# Patient Record
Sex: FEMALE | Race: WHITE | NOT HISPANIC OR LATINO | Employment: FULL TIME | URBAN - METROPOLITAN AREA
[De-identification: names, ages, dates, MRNs, and addresses within clinical notes are randomized per-mention and may not be internally consistent; named-entity substitution may affect disease eponyms.]

---

## 2017-01-03 ENCOUNTER — PRE VISIT (OUTPATIENT)
Dept: MATERNAL FETAL MEDICINE | Facility: CLINIC | Age: 32
End: 2017-01-03

## 2017-01-06 ENCOUNTER — HOSPITAL ENCOUNTER (OUTPATIENT)
Dept: ULTRASOUND IMAGING | Facility: CLINIC | Age: 32
Discharge: HOME | End: 2017-01-06
Attending: ADVANCED PRACTICE MIDWIFE | Admitting: ADVANCED PRACTICE MIDWIFE
Payer: COMMERCIAL

## 2017-01-06 ENCOUNTER — OFFICE VISIT (OUTPATIENT)
Dept: MATERNAL FETAL MEDICINE | Facility: CLINIC | Age: 32
End: 2017-01-06
Attending: ADVANCED PRACTICE MIDWIFE
Payer: COMMERCIAL

## 2017-01-06 DIAGNOSIS — O30.032 MONOCHORIONIC DIAMNIOTIC TWIN GESTATION IN SECOND TRIMESTER: Primary | ICD-10-CM

## 2017-01-06 DIAGNOSIS — O26.90 PREGNANCY RELATED CONDITION, UNSPECIFIED TRIMESTER: ICD-10-CM

## 2017-01-06 PROCEDURE — 76812 OB US DETAILED ADDL FETUS: CPT

## 2017-01-06 PROCEDURE — 76820 UMBILICAL ARTERY ECHO: CPT | Performed by: ADVANCED PRACTICE MIDWIFE

## 2017-01-06 NOTE — PROGRESS NOTES
"Please see \"Imaging\" tab under \"Chart Review\" for details of today's US at the [facility].    Anthony Hull MD  Maternal-Fetal Medicine    "

## 2017-01-18 ENCOUNTER — HOSPITAL ENCOUNTER (OUTPATIENT)
Dept: CARDIOLOGY | Facility: CLINIC | Age: 32
End: 2017-01-18
Attending: OBSTETRICS & GYNECOLOGY
Payer: COMMERCIAL

## 2017-01-18 ENCOUNTER — OFFICE VISIT (OUTPATIENT)
Dept: MATERNAL FETAL MEDICINE | Facility: CLINIC | Age: 32
End: 2017-01-18
Attending: OBSTETRICS & GYNECOLOGY
Payer: COMMERCIAL

## 2017-01-18 ENCOUNTER — HOSPITAL ENCOUNTER (OUTPATIENT)
Dept: ULTRASOUND IMAGING | Facility: CLINIC | Age: 32
Discharge: HOME | End: 2017-01-18
Attending: OBSTETRICS & GYNECOLOGY | Admitting: OBSTETRICS & GYNECOLOGY
Payer: COMMERCIAL

## 2017-01-18 DIAGNOSIS — O30.032 MONOCHORIONIC DIAMNIOTIC TWIN GESTATION IN SECOND TRIMESTER: ICD-10-CM

## 2017-01-18 DIAGNOSIS — O30.032 MONOCHORIONIC DIAMNIOTIC TWIN GESTATION IN SECOND TRIMESTER: Primary | ICD-10-CM

## 2017-01-18 PROCEDURE — 76825 ECHO EXAM OF FETAL HEART: CPT

## 2017-01-18 PROCEDURE — 76820 UMBILICAL ARTERY ECHO: CPT | Performed by: OBSTETRICS & GYNECOLOGY

## 2017-01-18 PROCEDURE — 76816 OB US FOLLOW-UP PER FETUS: CPT | Mod: 59

## 2017-01-18 PROCEDURE — 76825 ECHO EXAM OF FETAL HEART: CPT | Mod: 59

## 2017-02-03 ENCOUNTER — OFFICE VISIT (OUTPATIENT)
Dept: MATERNAL FETAL MEDICINE | Facility: CLINIC | Age: 32
End: 2017-02-03
Attending: OBSTETRICS & GYNECOLOGY
Payer: COMMERCIAL

## 2017-02-03 ENCOUNTER — HOSPITAL ENCOUNTER (OUTPATIENT)
Dept: ULTRASOUND IMAGING | Facility: CLINIC | Age: 32
Discharge: HOME | End: 2017-02-03
Attending: OBSTETRICS & GYNECOLOGY | Admitting: OBSTETRICS & GYNECOLOGY
Payer: COMMERCIAL

## 2017-02-03 DIAGNOSIS — O30.032 MONOCHORIONIC DIAMNIOTIC TWIN GESTATION IN SECOND TRIMESTER: ICD-10-CM

## 2017-02-03 DIAGNOSIS — O40.1XX2: ICD-10-CM

## 2017-02-03 DIAGNOSIS — O30.032 MONOCHORIONIC DIAMNIOTIC TWIN GESTATION IN SECOND TRIMESTER: Primary | ICD-10-CM

## 2017-02-03 PROCEDURE — 76816 OB US FOLLOW-UP PER FETUS: CPT | Mod: 59

## 2017-02-03 PROCEDURE — 76820 UMBILICAL ARTERY ECHO: CPT | Performed by: OBSTETRICS & GYNECOLOGY

## 2017-02-03 NOTE — PROGRESS NOTES
"Please see \"Imaging\" tab under \"Chart Review\" for details of today's US.      Karin Castillo, DO  Maternal-Fetal Medicine  February 3, 2017      "

## 2017-02-17 ENCOUNTER — OFFICE VISIT (OUTPATIENT)
Dept: MATERNAL FETAL MEDICINE | Facility: CLINIC | Age: 32
End: 2017-02-17
Attending: OBSTETRICS & GYNECOLOGY
Payer: COMMERCIAL

## 2017-02-17 ENCOUNTER — HOSPITAL ENCOUNTER (OUTPATIENT)
Dept: ULTRASOUND IMAGING | Facility: CLINIC | Age: 32
Discharge: HOME | End: 2017-02-17
Attending: OBSTETRICS & GYNECOLOGY | Admitting: OBSTETRICS & GYNECOLOGY
Payer: COMMERCIAL

## 2017-02-17 DIAGNOSIS — O30.033 MONOCHORIONIC DIAMNIOTIC TWIN GESTATION IN THIRD TRIMESTER: Primary | ICD-10-CM

## 2017-02-17 DIAGNOSIS — O30.032 MONOCHORIONIC DIAMNIOTIC TWIN GESTATION IN SECOND TRIMESTER: ICD-10-CM

## 2017-02-17 DIAGNOSIS — O40.3XX1 POLYHYDRAMNIOS IN THIRD TRIMESTER, FETUS 1: ICD-10-CM

## 2017-02-17 DIAGNOSIS — O40.3XX2 POLYHYDRAMNIOS, THIRD TRIMESTER, FETUS 2: ICD-10-CM

## 2017-02-17 PROCEDURE — 76816 OB US FOLLOW-UP PER FETUS: CPT | Mod: 59

## 2017-02-17 PROCEDURE — 76820 UMBILICAL ARTERY ECHO: CPT | Performed by: OBSTETRICS & GYNECOLOGY

## 2017-02-17 NOTE — MR AVS SNAPSHOT
After Visit Summary   2/17/2017    Julius Wilson    MRN: [id number]           Patient Information     Date Of Birth          1985        Visit Information        Provider Department      2/17/2017 2:45 PM Anthony Hull MD [facility] Maternal Fetal Medicine [facility]        Today's Diagnoses     Monochorionic diamniotic twin gestation in third trimester    -  1    Polyhydramnios in third trimester, fetus 1        Polyhydramnios, third trimester, fetus 2           Follow-ups after your visit        Your next 10 appointments already scheduled     Feb 24, 2017  3:30 PM CST   MFM BPP TWINS with [email or web address]   [facility] Maternal Fetal Medicine Ultrasound - [facility])    [address]  [facility] [address] [phone]            Feb 24, 2017  4:00 PM CST   Radiology MD with RH MFAARON MAHONEY   [facility] Maternal Fetal Medicine [facility] ([facility])    [address]  [facility] [address] [phone]           Please arrive at the time given for your first appointment.  This visit is used internally to schedule the physician's time during your ultrasound.            Mar 03, 2017  3:00 PM CST   MFM US COMPRE TWINS F/U with [email or web address]   [facility] Maternal Fetal Medicine Ultrasound - [facility] ([facility])    [address]  [facility] [address] [phone]           Wear comfortable clothes and leave your valuables at home.            Mar 03, 2017  3:30 PM CST   Radiology MD with  MFAARON MAHONEY   [facility] Maternal Fetal Medicine [facility] ([facility])    [address]  [facility] [address] [phone]           Please arrive at the time given for your first appointment.  This visit is used internally to schedule the physician's time during your ultrasound.            Mar 10, 2017  3:00 PM CST   MFM BPP TWINS with [email or web address]   [facility] Maternal Fetal Medicine Ultrasound -  [facility] ([facility])    [address]  [facility] [address]   [phone]            Mar 10, 2017  3:30 PM CST   Radiology MD with RH MFAARON MAHONEY   [facility] Maternal Fetal [facility] ([facility])    [address]  [facility] [address]   [phone]           Please arrive at the time given for your first appointment.  This visit is used internally to schedule the physician's time during your ultrasound.            Mar 17, 2017  3:00 PM CDT   MFM BPP TWINS with [email or web address]   [facility] Fetal Medicine Ultrasound - [facility])    [address]  [facility] [address]   [phone]            Mar 17, 2017  3:30 PM CDT   Radiology MD with RH DINORA MAHONEY   [facility] ([facility])    [address]  [facility] [address] [phone]           Please arrive at the time given for your first appointment.  This visit is used internally to schedule the physician's time during your ultrasound.              Future tests that were ordered for you today     Open Future Orders        Priority Expected Expires Ordered    MFM BPP Twins Routine 2/24/2017 12/17/2017 2/17/2017    MFM BPP Twins Routine 3/10/2017 12/17/2017 2/17/2017    MFM BPP Twins Routine 3/17/2017 12/17/2017 2/17/2017            Who to contact     If you have questions or need follow up information about today's clinic visit or your schedule please contact [facility]ERNAL FETAL [facility] directly at [phone].  Normal or non-critical lab and imaging results will be communicated to you by MyChart, letter or phone within 4 business days after the clinic has received the results. If you do not hear from us within 7 days, please contact the clinic through MyChart or phone. If you have a critical or abnormal lab result, we will notify you by phone as soon as possible.  Submit refill  "requests through Status Overload or call your pharmacy and they will forward the refill request to us. Please allow 3 business days for your refill to be completed.          Additional Information About Your Visit        Kyphart Information     Status Overload lets you send messages to your doctor, view your test results, renew your prescriptions, schedule appointments and more. To sign up, go to [email or web address] . Click on \"Log in\" on the left side of the screen, which will take you to the Welcome page. Then click on \"Sign up Now\" on the right side of the page.     You will be asked to enter the access code listed below, as well as some personal information. Please follow the directions to create your username and password.     Your access code is: [email or web address]  Expires: 2017  3:15 PM     Your access code will  in 90 days. If you need help or a new code, please call your [place] clinic or [phone].        Care EveryWhere ID     This is your Care EveryWhere ID. This could be used by other organizations to access your [place] medical records  [id number]        Your Vitals Were     Last Period                   2016            Blood Pressure from Last 3 Encounters:   No data found for BP    Weight from Last 3 Encounters:   No data found for Wt               Primary Care Provider    No Pcp Confirmed       No address on file        Thank you!     Thank you for choosing [facility]  for your care. Our goal is always to provide you with excellent care. Hearing back from our patients is one way we can continue to improve our services. Please take a few minutes to complete the written survey that you may receive in the mail after your visit with us. Thank you!             Your Updated Medication List - Protect others around you: Learn how to safely use, store and throw away your medicines at www.disposemymeds.org.      Notice  As of 2017  3:15 PM    You have not been " prescribed any medications.

## 2017-02-17 NOTE — PROGRESS NOTES
"Please see \"Imaging\" tab under \"Chart Review\" for details of today's US at the [facility].    Anthony Hull MD  Maternal-Fetal Medicine      "

## 2017-02-24 ENCOUNTER — OFFICE VISIT (OUTPATIENT)
Dept: MATERNAL FETAL MEDICINE | Facility: CLINIC | Age: 32
End: 2017-02-24
Attending: OBSTETRICS & GYNECOLOGY
Payer: COMMERCIAL

## 2017-02-24 ENCOUNTER — HOSPITAL ENCOUNTER (OUTPATIENT)
Dept: ULTRASOUND IMAGING | Facility: CLINIC | Age: 32
Discharge: HOME | End: 2017-02-24
Attending: OBSTETRICS & GYNECOLOGY | Admitting: OBSTETRICS & GYNECOLOGY
Payer: COMMERCIAL

## 2017-02-24 DIAGNOSIS — O30.033 MONOCHORIONIC DIAMNIOTIC TWIN GESTATION IN THIRD TRIMESTER: Primary | ICD-10-CM

## 2017-02-24 DIAGNOSIS — O30.033 MONOCHORIONIC DIAMNIOTIC TWIN GESTATION IN THIRD TRIMESTER: ICD-10-CM

## 2017-02-24 DIAGNOSIS — O40.3XX1 POLYHYDRAMNIOS IN THIRD TRIMESTER, FETUS 1: ICD-10-CM

## 2017-02-24 PROCEDURE — 76819 FETAL BIOPHYS PROFIL W/O NST: CPT | Performed by: OBSTETRICS & GYNECOLOGY

## 2017-02-24 PROCEDURE — 76819 FETAL BIOPHYS PROFIL W/O NST: CPT

## 2017-02-24 NOTE — PROGRESS NOTES
"Please see \"Imaging\" tab under \"Chart Review\" for details of today's US at the [facility].    Anthony Hull MD  Maternal-Fetal Medicine    "

## 2017-02-24 NOTE — MR AVS SNAPSHOT
After Visit Summary   2/24/2017    Julius Wilson    MRN: [id number]           Patient Information     Date Of Birth          1985        Visit Information        Provider Department      2/24/2017 4:00 PM Anthony Hull MD [facility] Maternal Fetal Medicine [facility]        Today's Diagnoses     Monochorionic diamniotic twin gestation in third trimester    -  1       Follow-ups after your visit        Your next 10 appointments already scheduled     Mar 03, 2017  3:00 PM CST   MFM US COMPRE TWINS F/U with [email or web address]   [facility] Maternal Fetal Medicine Ultrasound - [facility] ([facility])    [address]  [facility] [address] [phone]           Wear comfortable clothes and leave your valuables at home.            Mar 03, 2017  3:30 PM CST   Radiology MD with  MFAARON MAHONEY   [facility] Maternal Fetal Medicine [facility] ([facility])    [address]  [facility] [address] [phone]           Please arrive at the time given for your first appointment.  This visit is used internally to schedule the physician's time during your ultrasound.            Mar 10, 2017  3:00 PM CST   MFM BPP TWINS with [email or web address]   [facility] Maternal Fetal Medicine Ultrasound - [facility] ([facility])    [address]  [facility] [address] [phone]            Mar 10, 2017  3:30 PM CST   Radiology MD with  MFAARON MAHONEY   [facility] Fetal Medicine [facility] ([facility])    [address]  [facility] [address] [phone]           Please arrive at the time given for your first appointment.  This visit is used internally to schedule the physician's time during your ultrasound.            Mar 17, 2017  3:00 PM CDT   MFM BPP TWINS with [email or web address]   [facility] Maternal Fetal Medicine Ultrasound - [facility] ([facility])    [address]  [facility] [address]  "  [phone]            Mar 17, 2017  3:30 PM CDT   Radiology MD with  DINORA MAHONEY   [facility] Maternal Fetal Medicine [facility] ([facility])    [address] [facility] [address]  [facility] [address] [phone]           Please arrive at the time given for your first appointment.  This visit is used internally to schedule the physician's time during your ultrasound.              Who to contact     If you have questions or need follow up information about today's clinic visit or your schedule please contact [facility] MATERNAL FETAL MEDICINE [facility] directly at [phone].  Normal or non-critical lab and imaging results will be communicated to you by PlayOn! Sportshart, letter or phone within 4 business days after the clinic has received the results. If you do not hear from us within 7 days, please contact the clinic through PlayOn! Sportshart or phone. If you have a critical or abnormal lab result, we will notify you by phone as soon as possible.  Submit refill requests through Teramind or call your pharmacy and they will forward the refill request to us. Please allow 3 business days for your refill to be completed.          Additional Information About Your Visit        PlayOn! [employer]sharHealth Plotter Information     Teramind lets you send messages to your doctor, view your test results, renew your prescriptions, schedule appointments and more. To sign up, go to [email or web address] . Click on \"Log in\" on the left side of the screen, which will take you to the Welcome page. Then click on \"Sign up Now\" on the right side of the page.     You will be asked to enter the access code listed below, as well as some personal information. Please follow the directions to create your username and password.     Your access code is: [email or web address]  Expires: 2017  3:15 PM     Your access code will  in 90 days. If you need help or a new code, please call your [place] clinic or [phone].        Care EveryWhere ID     This is your Care EveryWhere " ID. This could be used by other organizations to access your [place] medical records  [id number]        Your Vitals Were     Last Period                   08/12/2016            Blood Pressure from Last 3 Encounters:   No data found for BP    Weight from Last 3 Encounters:   No data found for Wt              Today, you had the following     No orders found for display       Primary Care Provider    No Pcp Confirmed       No address on file        Thank you!     Thank you for choosing [facility]  for your care. Our goal is always to provide you with excellent care. Hearing back from our patients is one way we can continue to improve our services. Please take a few minutes to complete the written survey that you may receive in the mail after your visit with us. Thank you!             Your Updated Medication List - Protect others around you: Learn how to safely use, store and throw away your medicines at www.disposemymeds.org.      Notice  As of 2/24/2017  4:33 PM    You have not been prescribed any medications.

## 2017-03-03 ENCOUNTER — HOSPITAL ENCOUNTER (OUTPATIENT)
Dept: ULTRASOUND IMAGING | Facility: CLINIC | Age: 32
Discharge: HOME | End: 2017-03-03
Attending: OBSTETRICS & GYNECOLOGY | Admitting: OBSTETRICS & GYNECOLOGY
Payer: COMMERCIAL

## 2017-03-03 ENCOUNTER — HOSPITAL ENCOUNTER (OUTPATIENT)
Facility: CLINIC | Age: 32
Discharge: HOME | End: 2017-03-05
Attending: OBSTETRICS & GYNECOLOGY | Admitting: OBSTETRICS & GYNECOLOGY
Payer: COMMERCIAL

## 2017-03-03 ENCOUNTER — OFFICE VISIT (OUTPATIENT)
Dept: MATERNAL FETAL MEDICINE | Facility: CLINIC | Age: 32
End: 2017-03-03
Attending: OBSTETRICS & GYNECOLOGY
Payer: COMMERCIAL

## 2017-03-03 ENCOUNTER — HOSPITAL ENCOUNTER (OUTPATIENT)
Dept: ULTRASOUND IMAGING | Facility: CLINIC | Age: 32
End: 2017-03-03
Attending: OBSTETRICS & GYNECOLOGY
Payer: COMMERCIAL

## 2017-03-03 DIAGNOSIS — O43.023 TWIN TO TWIN TRANSFUSION, THIRD TRIMESTER: ICD-10-CM

## 2017-03-03 DIAGNOSIS — O30.039 MONOCHORIONIC DIAMNIOTIC TWIN PREGNANCY: Primary | ICD-10-CM

## 2017-03-03 DIAGNOSIS — O30.032 MONOCHORIONIC DIAMNIOTIC TWIN GESTATION IN SECOND TRIMESTER: ICD-10-CM

## 2017-03-03 DIAGNOSIS — O30.033 MONOCHORIONIC DIAMNIOTIC TWIN GESTATION IN THIRD TRIMESTER: Primary | ICD-10-CM

## 2017-03-03 DIAGNOSIS — O41.00X2: ICD-10-CM

## 2017-03-03 DIAGNOSIS — O40.1XX1: ICD-10-CM

## 2017-03-03 LAB
A1 MICROGLOB PLACENTAL VAG QL: NEGATIVE
ABO + RH BLD: NORMAL
ABO + RH BLD: NORMAL
BLD GP AB SCN SERPL QL: NORMAL
BLOOD BANK CMNT PATIENT-IMP: NORMAL
ERYTHROCYTE [DISTWIDTH] IN BLOOD BY AUTOMATED COUNT: 12.7 % (ref 10–15)
HCT VFR BLD AUTO: 31.4 % (ref 35–47)
HGB BLD-MCNC: 10.5 G/DL (ref 11.7–15.7)
MCH RBC QN AUTO: 30.2 PG (ref 26.5–33)
MCHC RBC AUTO-ENTMCNC: 33.4 G/DL (ref 31.5–36.5)
MCV RBC AUTO: 90 FL (ref 78–100)
PLATELET # BLD AUTO: 242 10E9/L (ref 150–450)
RBC # BLD AUTO: 3.48 10E12/L (ref 3.8–5.2)
SPECIMEN EXP DATE BLD: NORMAL
WBC # BLD AUTO: 12.9 10E9/L (ref 4–11)

## 2017-03-03 PROCEDURE — 76815 OB US LIMITED FETUS(S): CPT

## 2017-03-03 PROCEDURE — 76816 OB US FOLLOW-UP PER FETUS: CPT | Mod: 59

## 2017-03-03 PROCEDURE — 25000125 ZZHC RX 250: Performed by: OBSTETRICS & GYNECOLOGY

## 2017-03-03 PROCEDURE — 84112 EVAL AMNIOTIC FLUID PROTEIN: CPT | Performed by: OBSTETRICS & GYNECOLOGY

## 2017-03-03 PROCEDURE — 85027 COMPLETE CBC AUTOMATED: CPT | Performed by: OBSTETRICS & GYNECOLOGY

## 2017-03-03 PROCEDURE — 86900 BLOOD TYPING SEROLOGIC ABO: CPT | Performed by: OBSTETRICS & GYNECOLOGY

## 2017-03-03 PROCEDURE — 96372 THER/PROPH/DIAG INJ SC/IM: CPT | Mod: 59

## 2017-03-03 PROCEDURE — 25000125 ZZHC RX 250

## 2017-03-03 PROCEDURE — 36415 COLL VENOUS BLD VENIPUNCTURE: CPT | Performed by: OBSTETRICS & GYNECOLOGY

## 2017-03-03 PROCEDURE — 86901 BLOOD TYPING SEROLOGIC RH(D): CPT | Performed by: OBSTETRICS & GYNECOLOGY

## 2017-03-03 PROCEDURE — 86850 RBC ANTIBODY SCREEN: CPT | Performed by: OBSTETRICS & GYNECOLOGY

## 2017-03-03 PROCEDURE — 59001 AMNIOCENTESIS THERAPEUTIC: CPT | Performed by: OBSTETRICS & GYNECOLOGY

## 2017-03-03 PROCEDURE — 76819 FETAL BIOPHYS PROFIL W/O NST: CPT | Performed by: OBSTETRICS & GYNECOLOGY

## 2017-03-03 RX ORDER — CALCIUM CARBONATE 500(1250)
500 TABLET ORAL 2 TIMES DAILY
COMMUNITY
End: 2021-11-29

## 2017-03-03 RX ORDER — FOLIC ACID 1 MG/1
TABLET ORAL
COMMUNITY
Start: 2017-01-09 | End: 2021-11-29

## 2017-03-03 RX ORDER — SODIUM CHLORIDE, SODIUM LACTATE, POTASSIUM CHLORIDE, CALCIUM CHLORIDE 600; 310; 30; 20 MG/100ML; MG/100ML; MG/100ML; MG/100ML
INJECTION, SOLUTION INTRAVENOUS CONTINUOUS
Status: DISCONTINUED | OUTPATIENT
Start: 2017-03-03 | End: 2017-03-03

## 2017-03-03 RX ORDER — ONDANSETRON 2 MG/ML
4 INJECTION INTRAMUSCULAR; INTRAVENOUS EVERY 6 HOURS PRN
Status: DISCONTINUED | OUTPATIENT
Start: 2017-03-03 | End: 2017-03-05 | Stop reason: HOSPADM

## 2017-03-03 RX ORDER — PRENATAL VIT/IRON FUM/FOLIC AC 27MG-0.8MG
1 TABLET ORAL DAILY
COMMUNITY
End: 2021-11-29

## 2017-03-03 RX ORDER — BETAMETHASONE SODIUM PHOSPHATE AND BETAMETHASONE ACETATE 3; 3 MG/ML; MG/ML
12 INJECTION, SUSPENSION INTRA-ARTICULAR; INTRALESIONAL; INTRAMUSCULAR; SOFT TISSUE ONCE
Status: COMPLETED | OUTPATIENT
Start: 2017-03-03 | End: 2017-03-03

## 2017-03-03 RX ORDER — ACETAMINOPHEN 325 MG/1
650 TABLET ORAL EVERY 6 HOURS PRN
Status: DISCONTINUED | OUTPATIENT
Start: 2017-03-03 | End: 2017-03-05 | Stop reason: HOSPADM

## 2017-03-03 RX ADMIN — BETAMETHASONE SODIUM PHOSPHATE AND BETAMETHASONE ACETATE 12 MG: 3; 3 INJECTION, SUSPENSION INTRA-ARTICULAR; INTRALESIONAL; INTRAMUSCULAR at 20:02

## 2017-03-03 NOTE — IP AVS SNAPSHOT
MRN:[id number]                      After Visit Summary   3/3/2017    Julius Wilson    MRN: [id number]           Thank you!     Thank you for choosing [place] for your care. Our goal is always to provide you with excellent care. Hearing back from our patients is one way we can continue to improve our services. Please take a few minutes to complete the written survey that you may receive in the mail after you visit with us. Thank you!        Patient Information     Date Of Birth          1985        About your hospital stay     You were admitted on:  March 3, 2017 You last received care in the:  [facility]    You were discharged on:  March 5, 2017       Who to Call     For medical emergencies, please call 911.  For non-urgent questions about your medical care, please call your primary care provider or clinic, None          Attending Provider     Provider Specialty    Anthony Hull MD OB/Gyn       Primary Care Provider    No Pcp Confirmed       No address on file        Your next 10 appointments already scheduled     Mar 10, 2017  3:00 PM CST   MFM BPP TWINS with [email or web address]   [facility]etal [facility] Ultrasound - [facility])    [address]  [facility] [address]   [phone]            Mar 10, 2017  3:30 PM CST   Radiology MD with  DINORA MAHONEY   [facility] Maternal Fetal Medicine - [facility])    [address]  [facility] [address]   [phone]           Please arrive at the time given for your first appointment.  This visit is used internally to schedule the physician's time during your ultrasound.            Mar 17, 2017  3:00 PM CDT   MFM BPP TWINS with [email or web address]   [facility] Maternal Fetal Medicine Ultrasound - [place] ([facility])    [address]  [facility] [address] [phone]            Mar 17, 2017  3:30 PM CDT   Radiology MD with UR  DINORA MAHONEY   [facility] Maternal Fetal Medicine - [place] ([facility])    [address]  [facility] [address]   [phone]           Please arrive at the time given for your first appointment.  This visit is used internally to schedule the physician's time during your ultrasound.            Mar 24, 2017  2:15 PM CDT   DINORA BPP TWINS with [email or web address]   [facility] Maternal Fetal Medicine Ultrasound - [place] ([facility])    [address]  [facility] [address]   [phone]            Mar 24, 2017  2:45 PM CDT   Radiology MD with UR DINORA MAHONEY   [facility] Maternal Fetal Medicine - [place] ([facility])    [address]  [facility] [address]   [phone]           Please arrive at the time given for your first appointment.  This visit is used internally to schedule the physician's time during your ultrasound.            Mar 31, 2017  1:30 PM CDT   DINORA US COMPRE TWINS F/U with [email or web address]   [facility] Maternal Fetal Medicine Ultrasound - [place] ([facility])    [address]  [facility] [address] [phone]           Wear comfortable clothes and leave your valuables at home.            Mar 31, 2017  2:00 PM CDT   Radiology MD with UR DINORA MAHONEY   [facility] Maternal Fetal Medicine - [place] ([facility])    [address]  [facility] [address]   [phone]           Please arrive at the time given for your first appointment.  This visit is used internally to schedule the physician's time during your ultrasound.              Further instructions from your care team       Discharge Instruction for Undelivered Patients      You were seen for: Fetal Assessment  We Consulted: Dr. Hull  You had (Test or Medicine):monitoring, ultrasound     Diet:   Drink 8 to 12 glasses of liquids (milk, juice, water) every day.  You may  "eat meals and snacks.     Activity:  Call your doctor or nurse midwife if your baby is moving less than usual.     Call your provider if you notice:  Swelling in your face or increased swelling in your hands or legs.  Headaches that are not relieved by Tylenol (acetaminophen).  Changes in your vision (blurring: seeing spots or stars.)  Nausea (sick to your stomach) and vomiting (throwing up).   Weight gain of 5 pounds or more per week.  Heartburn that doesn't go away.  Signs of bladder infection: pain when you urinate (use the toilet), need to go more often and more urgently.  The bag of ribera (rupture of membranes) breaks, or you notice leaking in your underwear.  Bright red blood in your underwear.  Abdominal (lower belly) or stomach pain.  For first baby: Contractions (tightening) less than 5 minutes apart for one hour or more.  Second (plus) baby: Contractions (tightening) less than 10 minutes apart and getting stronger.  *If less than 34 weeks: Contractions (tightenings) more than 6 times in one hour.  Increase or change in vaginal discharge (note the color and amount)      Follow-up:  3/6/2017 in the [facility] 4th floor of the Professional building.        Pending Results     No orders found from 3/1/2017 to 3/4/2017.            Admission Information     Date & Time Provider Department Dept. Phone    3/3/2017 Anthony Hull MD [facility] [phone]      Your Vitals Were     Blood Pressure Pulse Temperature Respirations Last Period       102/66 98 98  F (36.7  C) (Oral) 20 08/12/2016       Add2paper Information     Add2paper lets you send messages to your doctor, view your test results, renew your prescriptions, schedule appointments and more. To sign up, go to [email or web address] . Click on \"Log in\" on the left side of the screen, which will take you to the Welcome page. Then click on \"Sign up Now\" on the right side of the page.     You will be asked to enter the access code listed below, as " well as some personal information. Please follow the directions to create your username and password.     Your access code is: [email or web address]  Expires: 2017  3:15 PM     Your access code will  in 90 days. If you need help or a new code, please call your [place] clinic or [phone].        Care EveryWhere ID     This is your Care EveryWhere ID. This could be used by other organizations to access your [place] medical records  [id number]           Review of your medicines      CONTINUE these medicines which have NOT CHANGED        Dose / Directions    calcium carbonate 500 MG tablet   Commonly known as:  OS-TIARRA 500 mg [place]. Ca        Dose:  500 mg   Take 500 mg by mouth 2 times daily   Refills:  0       folic acid 1 MG tablet   Commonly known as:  FOLVITE        Refills:  0       IRON SUPPLEMENT PO   Indication:  Anemia From Inadequate Iron in the Body        Dose:  325 mg   Take 325 mg by mouth 2 times daily (with meals)   Refills:  0       prenatal multivitamin  plus iron 27-0.8 MG Tabs per tablet        Dose:  1 tablet   Take 1 tablet by mouth daily   Refills:  0       VITAMIN D (CHOLECALCIFEROL) PO        Dose:  2000 Units   Take 2,000 Units by mouth daily   Refills:  0                Protect others around you: Learn how to safely use, store and throw away your medicines at www.disposemymeds.org.             Medication List: This is a list of all your medications and when to take them. Check marks below indicate your daily home schedule. Keep this list as a reference.      Medications           Morning Afternoon Evening Bedtime As Needed    calcium carbonate 500 MG tablet   Commonly known as:  OS-TIARRA 500 mg [place]. Ca   Take 500 mg by mouth 2 times daily                                folic acid 1 MG tablet   Commonly known as:  FOLVITE                                IRON SUPPLEMENT PO   Take 325 mg by mouth 2 times daily (with meals)                                prenatal multivitamin  plus iron  27-0.8 MG Tabs per tablet   Take 1 tablet by mouth daily                                VITAMIN D (CHOLECALCIFEROL) PO   Take 2,000 Units by mouth daily

## 2017-03-03 NOTE — IP AVS SNAPSHOT
[facility]    [address] [place] [address]    Phone:  [phone]                                       After Visit Summary   3/3/2017    Julius Wilson    MRN: [id number]           After Visit Summary Signature Page     I have received my discharge instructions, and my questions have been answered. I have discussed any challenges I see with this plan with the nurse or doctor.    ..........................................................................................................................................  Patient/Patient Representative Signature      ..........................................................................................................................................  Patient Representative Print Name and Relationship to Patient    ..................................................               ................................................  Date                                            Time    ..........................................................................................................................................  Reviewed by Signature/Title    ...................................................              ..............................................  Date                                                            Time

## 2017-03-03 NOTE — PROGRESS NOTES
"Please see \"Imaging\" tab under \"Chart Review\" for details of today's US.      Karin Castillo, DO  Maternal-Fetal Medicine  March 3, 2017      "

## 2017-03-03 NOTE — MR AVS SNAPSHOT
After Visit Summary   3/3/2017    Julius Wilson    MRN: [id number]           Patient Information     Date Of Birth          1985        Visit Information        Provider Department      3/3/2017 3:30 PM Karin Castillo,  [facility]        Today's Diagnoses     Monochorionic diamniotic twin gestation in third trimester    -  1    Anhydramnios, unspecified trimester, fetus 2        Polyhydramnios, first trimester, fetus 1           Follow-ups after your visit        Your next 10 appointments already scheduled     Mar 10, 2017  3:00 PM CST   MFM BPP TWINS with [id number]   [facility] ([facility])    [address]  [facility] [address]   [phone]            Mar 10, 2017  3:30 PM CST   Radiology MD with RH MFM MD   [facility] ([facility])    [address]  [facility] [address]   [phone]           Please arrive at the time given for your first appointment.  This visit is used internally to schedule the physician's time during your ultrasound.            Mar 17, 2017  3:00 PM CDT   MFM BPP TWINS with [id number]   [facility])    [address]  [facility] [address] [phone]            Mar 17, 2017  3:30 PM CDT   Radiology MD with UR MFM MD   [facility])    [address]  [facility] [address]   [phone]           Please arrive at the time given for your first appointment.  This visit is used internally to schedule the physician's time during your ultrasound.            Mar 24, 2017  2:15 PM CDT   MFM BPP TWINS with [email or web address]   [facility] [place] ([facility]  [facility])    [address]  [facility] [address]   [phone]            Mar 24, 2017  2:45 PM CDT   Radiology MD with UR DINORA MAHONEY   [facility]etal Medicine - [place] ([facility])    [address]  [facility] [address]   [phone]           Please arrive at the time given for your first appointment.  This visit is used internally to schedule the physician's time during your ultrasound.            Mar 31, 2017  1:30 PM CDT   [facility] US COMPRE TWINS F/U with [id number]   [facility] Ultrasound - [facility])    [address]  [facility] [address]   [phone]           Wear comfortable clothes and leave your valuables at home.            Mar 31, 2017  2:00 PM CDT   Radiology MD with UR DINORA MAHONEY   [facility] - [facility])    [address]  [facility] [address]   [phone]           Please arrive at the time given for your first appointment.  This visit is used internally to schedule the physician's time during your ultrasound.              Future tests that were ordered for you today     Open Future Orders        Priority Expected Expires Ordered    [facility] BPP Twins Routine 3/24/2017 1/3/2018 3/3/2017    [facility] Comprehensive Twins F/U Routine 3/31/2017 1/3/2018 3/3/2017            Who to contact     If you have questions or need follow up information about today's clinic visit or your schedule please contact [facility] directly at [phone].  Normal or non-critical lab and imaging results will be communicated to you by MyChart, letter or phone within 4 business days after the clinic has received the results. If you do not hear from us within 7 days, please contact the clinic through MyChart or phone. If you have a critical or abnormal lab result, we will notify you by phone  "as soon as possible.  Submit refill requests through [employer] or call your pharmacy and they will forward the refill request to us. Please allow 3 business days for your refill to be completed.          Additional Information About Your Visit        Pum[employer]ing[employer] Information     [employer] lets you send messages to your doctor, view your test results, renew your prescriptions, schedule appointments and more. To sign up, go to [email or web address] . Click on \"Log in\" on the left side of the screen, which will take you to the Welcome page. Then click on \"Sign up Now\" on the right side of the page.     You will be asked to enter the access code listed below, as well as some personal information. Please follow the directions to create your username and password.     Your access code is: [email or web address]  Expires: 2017  3:15 PM     Your access code will  in 90 days. If you need help or a new code, please call your [facility] clinic or [phone].        Care EveryWhere ID     This is your Care EveryWhere ID. This could be used by other organizations to access your [facility] medical records  [id number]        Your Vitals Were     Last Period                   2016            Blood Pressure from Last 3 Encounters:   No data found for BP    Weight from Last 3 Encounters:   No data found for Wt               Primary Care Provider    No Pcp Confirmed       No address on file        Thank you!     Thank you for choosing [facility]  for your care. Our goal is always to provide you with excellent care. Hearing back from our patients is one way we can continue to improve our services. Please take a few minutes to complete the written survey that you may receive in the mail after your visit with us. Thank you!             Your Updated Medication List - Protect others around you: Learn how to safely use, store and throw away your medicines at www.disposemymeds.org.      Notice  As of 3/3/2017  " 5:57 PM    You have not been prescribed any medications.

## 2017-03-04 ENCOUNTER — HOSPITAL ENCOUNTER (OUTPATIENT)
Dept: ULTRASOUND IMAGING | Facility: CLINIC | Age: 32
End: 2017-03-04
Attending: OBSTETRICS & GYNECOLOGY
Payer: COMMERCIAL

## 2017-03-04 PROCEDURE — 99207 ZZC CDG-CODE CATEGORY CHANGED: CPT | Performed by: PHYSICIAN ASSISTANT

## 2017-03-04 PROCEDURE — 76819 FETAL BIOPHYS PROFIL W/O NST: CPT | Performed by: OBSTETRICS & GYNECOLOGY

## 2017-03-04 PROCEDURE — 99205 OFFICE O/P NEW HI 60 MIN: CPT | Performed by: PHYSICIAN ASSISTANT

## 2017-03-04 PROCEDURE — 96372 THER/PROPH/DIAG INJ SC/IM: CPT

## 2017-03-04 PROCEDURE — 76816 OB US FOLLOW-UP PER FETUS: CPT | Mod: 59

## 2017-03-04 PROCEDURE — 25000125 ZZHC RX 250: Performed by: OBSTETRICS & GYNECOLOGY

## 2017-03-04 RX ORDER — BETAMETHASONE SODIUM PHOSPHATE AND BETAMETHASONE ACETATE 3; 3 MG/ML; MG/ML
12 INJECTION, SUSPENSION INTRA-ARTICULAR; INTRALESIONAL; INTRAMUSCULAR; SOFT TISSUE ONCE
Status: COMPLETED | OUTPATIENT
Start: 2017-03-04 | End: 2017-03-04

## 2017-03-04 RX ADMIN — BETAMETHASONE SODIUM PHOSPHATE AND BETAMETHASONE ACETATE 12 MG: 3; 3 INJECTION, SUSPENSION INTRA-ARTICULAR; INTRALESIONAL; INTRAMUSCULAR at 20:39

## 2017-03-04 NOTE — PLAN OF CARE
Problem: Goal Outcome Summary  Goal: Goal Outcome Summary  Outcome: No Change  VSS. Stated cramping pain after procedure, states has improved. FHT for baby A and baby B appropriate for gestational age. Irritability noted on toco. Plan for continuous monitoring overnight and US in am. Will continue to monitor.

## 2017-03-04 NOTE — PROGRESS NOTES
Reduction amniocentesis procedure note    Amniocentesis Entries uterus: 1.    Sample: obtained.    Amniodrainage Instrument: syringe, 18-gauge Yueh catheter. Insertion site: lower left quadrant. Entries uterus: 1.    AF quality: clear yellow.    After informed consent and a TIME OUT the patient was prepped with betadine and draped with sterile towels. The site for needle insertion was infiltrated with 1% lidocaine. Under direct ultrasound visualization the amniotic cavity was entered on a single attempt. The stylus was removed and the catheter was attached to extension tubing. Using a 60 cc syringe 1440 cc of AF was removed and discarded without  difficulty. The JAVIER post procedure was 6.2 cm. EBL is not required for this procedure.    The patient declined submitting AF for karyotype.    Evaluation Post cardiac activity: present, normal. FHR post 153 bpm.  Post NST not performed.    Anthony Hull MD  Maternal-Fetal Medicine

## 2017-03-04 NOTE — PLAN OF CARE
"Problem: Goal Outcome Summary  Goal: Goal Outcome Summary  Outcome: No Change  VSS. Pt stated able to get some rest overnight. Pt also stated \"woke up several times and was too anxious to go back to sleep\". C/o of cramping discomfort at approx. 0400 and requested to be put on monitor early. FHT for baby A and baby B appropriate for gestational age. Noted some irritability with occ. Contractions. Pt stated decrease in anxiety after session of monitoring. Plan to get US in AM, probable stay until second beta this evening.      "

## 2017-03-04 NOTE — PLAN OF CARE
Problem: Pregnancy, Critically Ill/High Risk (Obstetrics)  Goal: Signs and Symptoms of Listed Potential Problems Will be Absent or Manageable (Pregnancy, Critically Ill/High Risk)  Signs and symptoms of listed potential problems will be absent or manageable by discharge/transition of care (reference Pregnancy, Critically Ill/High Risk (Obstetrics) CPG).   Outcome: Therapy, progress toward functional goals as expected  D: Patient offers no complaints. NICU came and talked to patient regarding what to expect with babies born at 29 weeks, patient states that was good information. Afternoon monitoring session showed accelerations and mod variability with irritability that patient does not feel. Will administer Betamethasone at 2000 and patient aware that she will be NPO after midnight for her morning ultrasound. P: Continue to monitor.

## 2017-03-04 NOTE — PROVIDER NOTIFICATION
03/04/17 0417   Uterine Activity   Monitor [place]   Contraction Frequency (minutes) applied   Contraction Quality Cramps   Resting Tone Palpated Soft     Pt complaining of onset of cramping discomfort. Requested to be put on monitor early.

## 2017-03-04 NOTE — CONSULTS
_       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 Betamethasone was administered on 3/3 &3/4.  Ms. Wilson was counseled on the expected hospital course, potential risks, and outcomes associated with an infant born at approximately 29 weeks gestation.  The typical hospital course was outlined by body system for 29week twins.  Then the typical course was modified for their unique situation to include donor and recipient twins.  We discussed respiratory failure, anemia, hypotension and failure to thrive for donor twin.  We discussed heart failure and pulmonary over circulation for recipient twin. The counseling included: introduction to the medical team and their roles, morbidity, mortality, initial delivery room stabilization, respiratory course, lung development, RDS, patent ductus arteriosus, retinopathy of prematurity, hyperbilirubinemia, hemodynamic support, infection (including NEC), intraventricular hemorrhage, nutrition, growth and development, and long term outcomes. We ended the consult with a tour of the NICU.  Ms. Wilson took notes and asked good questions throughout the encounter.  She is obviously anxious and processing a lot of information.  Please feel free to call with any additional questions or concerns.      Mariam Cohen PA-C 3/4/2017 11:44 AM   Advanced Practice Providers  [facility]        Floor Time (min): 15  Face to Face Time (min): 45  Total Time (minutes): 60  More than 50% of my time was spent in direct, face to face, antepartum counseling with the above patient.

## 2017-03-04 NOTE — PROGRESS NOTES
MFM Note    S: Mild cramping early am and now resolved. No LOF or VB. Good FM x 2    O:  /72  Pulse 98  Temp 97.6  F (36.4  C) (Oral)  Resp 18  LMP 2016    Abd - NT  FHT - Fetus 1 135, mod grey, accels, no decels            Fetus 2 140, mod grey, accels, no decels  Ctx - irritability    Fetus 1 - BPP 8/8, poly 8 cm, normal UAR doppler, bladder seen  Fetus 2 - BPP 6/8, anhydramnios, normal UAR doppler, bladder seen    A/P:  TTTS s/p single amnioreduction ~ 12 hours ago. Now with bladder seen in stuck twin. Plan to reassess tomorrow and consider 1 additional amnioreduction if needed. Would deliver for non-reassuring status, labor, abnormal dopplers. Plan repeat BMZ at . NICU consult today.    Anthony Hull MD  Maternal-Fetal Medicine      Met with patient again after NICU Consult and tour. She reports being a bit overwhelmed. I have suggested she stay until tomorrow am with TID fetal monitoring and prn for any sx. We will plan an ultrasound at 0700. If there is poly and oligo/anhydramnios we would perform 1 more amnioreduction. We would not do additional ones. The goal would be to get the JAVIER for the recipient to at least 4 cm. Delivery would be planned by  section if indicated. Shauna agrees to the plan of care.    Anthony Hull MD  Maternal-Fetal Medicine

## 2017-03-04 NOTE — H&P
[facility]  OB History and Physical      Julius Wilson MRN# [id number]   Age: 31 year old YOB: 1985     CC:  Mono/Di Twins; PPROM vs. TTTS    HPI:  Ms. Julius Wilson is a 31 year old  at 29w0d, IUI pregnancy, with Monochorionic/Diamniotic twins who presents to L&D due to concern for PPROM vs. twin to twin transfusion syndrome. She was seen today for a BPP which showed polyhdramnios of twin 1 and anhydramnios in twin 2. UARs were also found to be elevated in twin 1. Bladders were seen in both babies. (See imaging report.) She denied any leakage of fluid, but did note symptoms including increased abdominal pressure as well as difficulty sleeping overnight. Julius also denies vaginal bleeding or contractions currently. +FMs.     Pregnancy Complications:  1. Mono/Di twins   2. Stage I, TTTS    Prenatal Labs:   Lab Results   Component Value Date    ABO Pending 2017    RH Pending 2017    AS Pending 2017    HGB 10.5 (L) 2017       GBS Status:   No results found for: GBS    Ultrasounds  1. See Imaging report    OB History  Obstetric History       T0      TAB0   SAB0   E0   M0   L0       # Outcome Date GA Lbr Esdras/2nd Weight Sex Delivery Anes PTL Lv   1 Current                   PMHx:   No past medical history on file.  PSHx:   History reviewed. No pertinent past surgical history.  Meds:   Prescriptions Prior to Admission   Medication Sig Dispense Refill Last Dose     folic acid (FOLVITE) 1 MG tablet    3/2/2017 at Unknown time     Ferrous Sulfate (IRON SUPPLEMENT PO) Take 325 mg by mouth 2 times daily (with meals)   3/2/2017 at Unknown time     Prenatal Vit-Fe Fumarate-FA (PRENATAL MULTIVITAMIN  PLUS IRON) 27-0.8 MG TABS per tablet Take 1 tablet by mouth daily   3/2/2017 at Unknown time     calcium carbonate (OS-TIARRA 500 MG [place]. [place]) 500 MG tablet Take 500 mg by mouth 2 times daily   3/2/2017 at Unknown time     VITAMIN D,  CHOLECALCIFEROL, PO Take 2,000 Units by mouth daily   3/2/2017 at Unknown time     Allergies:    Allergies   Allergen Reactions     Sulfamethoxazole-Trimethoprim Other (See Comments)     Stiff neck       FmHx: No family history on file.  SocHx: She denies tobacco, alcohol, or other drug use during this pregnancy.    ROS:   See HPI    PE:  Vit: Patient Vitals for the past 4 hrs:   BP Temp Temp src Pulse Resp   17 1941 99/63 98.2  F (36.8  C) Oral 98 18      Gen: Well-appearing, NAD, comfortable   Pulm: Non labored  Abd: Soft, gravid, non-tender  Ext: Warm, NT, neg edema  Cx: deferred    FHT: A - Baseline 135, mod variability, + accelerations - decelerations    B - 135 mod grey + accels - decels  Beatty: Some irritability     Assessment  Ms. Julius Wilson is a 31 year old , at 29w0d here with mono/di twins for polydramnios and elevated UAR twin 1; anyhdramnios twin 2. R/o PPROM vs. TTTS.    Plan  - R/o PPROM - no h/o leakage of fluid, amnisure sent, results negative  - Stage I TTTS - as amnisure negative, plan made for amnioreduction, patient consented  - T&S, CBC sent  - Betamethasone #1 given @645p 3/3, #2 in 24h  - Plan for overnight observation after procedure, indocin if persistent cramping  - Repeat US and Dopplers in the AM  - May eat 1 hour after procedure if stable    The patient was discussed with Dr. Hull who is in agreement with the treatment plan.    Mariana Sauceda MD  MFM Fellow

## 2017-03-04 NOTE — PROVIDER NOTIFICATION
03/03/17 1921   Provider Notification   Provider Name/Title Dr. Hull   Method of Notification At Bedside   Notification Reason Other (Comment)     Dr. Hull at bedside. Amnisure negative. Plan to do amniocentesis. Consent signed by pt.

## 2017-03-04 NOTE — PROVIDER NOTIFICATION
03/03/17 2310   Provider Notification   Provider Name/Title Dr. Hull   Method of Notification In Department   Request Evaluate - Remote   Notification Reason Other (Comment)     Discussed frequency of vitals overnight. Currently ordered Q2, Dr. Hull will change order.

## 2017-03-04 NOTE — PROVIDER NOTIFICATION
03/03/17 7540   Provider Notification   Provider Name/Title Dr. Hull   Method of Notification In Department   Request Evaluate - Remote     Reviewed strip with provider. OK for pt to be taken off monitor. Plan to monitor again at 0600.

## 2017-03-05 ENCOUNTER — HOSPITAL ENCOUNTER (OUTPATIENT)
Dept: ULTRASOUND IMAGING | Facility: CLINIC | Age: 32
End: 2017-03-05
Attending: OBSTETRICS & GYNECOLOGY
Payer: COMMERCIAL

## 2017-03-05 VITALS
SYSTOLIC BLOOD PRESSURE: 102 MMHG | TEMPERATURE: 98 F | HEART RATE: 98 BPM | RESPIRATION RATE: 20 BRPM | DIASTOLIC BLOOD PRESSURE: 66 MMHG

## 2017-03-05 DIAGNOSIS — O30.033 MONOCHORIONIC DIAMNIOTIC TWIN GESTATION IN THIRD TRIMESTER: Primary | ICD-10-CM

## 2017-03-05 PROBLEM — O30.039 MONOCHORIONIC DIAMNIOTIC TWIN PREGNANCY: Status: ACTIVE | Noted: 2017-03-05

## 2017-03-05 PROCEDURE — 76819 FETAL BIOPHYS PROFIL W/O NST: CPT | Performed by: OBSTETRICS & GYNECOLOGY

## 2017-03-05 PROCEDURE — 99214 OFFICE O/P EST MOD 30 MIN: CPT | Mod: 25

## 2017-03-05 PROCEDURE — 99214 OFFICE O/P EST MOD 30 MIN: CPT

## 2017-03-05 PROCEDURE — 76816 OB US FOLLOW-UP PER FETUS: CPT | Mod: 59

## 2017-03-05 NOTE — PROGRESS NOTES
[facility] Daily Note and Discharge from Observation    S: No c/o contractions, LOF or VB. Has FM x 2    O:  Patient Vitals for the past 24 hrs:   BP Temp Temp src Resp   03/05/17 0802 102/66 98  F (36.7  C) Oral 20   03/04/17 2301 117/72 98.1  F (36.7  C) Oral 18   03/04/17 2042 117/69 97.6  F (36.4  C) Oral 18   03/04/17 1329 109/67 98  F (36.7  C) Oral 20     Abd - NT  FHT - Fetus 1 130, mod grey, accels, single grey decel over 2 hour period            Fetus 2  135, mod grey, accels, no decels  Ctx - irritability    US - Fetus 1: JAVIER 6.6, BPP 8/8, bladder, normal UAR S/D with single increase at 95%tile          Fetus 2: JAVIER  1  . BPP 6/8, bladder, normal UAR S/D ratio    A/P:  TTTS s/p single reduction amniocentesis now with fluid around fetus 2 (donor) and full bladder and normal S/D ratio. Fetus 1 (recipient) without reaccumulation of fluid to > 8 cm. Given category 1 tracing x 2 for 36 hours, no PTL, and BPP reassuiring plan d/c to home with follow-up at [facility] at 8:45 am. Plan fetal echos tomorrow as well. Further follow-up pending results of studies tomorrow. Recurrent ploy/oligo would likely require delivery. Abnormal dopplers would require delivery.    Time spent in discharge 30 minutes.    Anthony Hull MD  Maternal-Fetal Medicine

## 2017-03-05 NOTE — PROVIDER NOTIFICATION
03/05/17 0703   Provider Notification   Provider Name/Title Dr. Hull   Method of Notification In Department   Request Evaluate - Remote   Notification Reason Decels     Notified provider about prolonged x1. Reviewed strip with provider. Wants pt to continue on monitor until AM US.

## 2017-03-05 NOTE — PLAN OF CARE
Problem: Goal Outcome Summary  Goal: Goal Outcome Summary  Outcome: Improving  VSS. Denies ctxs, LOF or bleeding. States active movement in fetus x2.  Able to get rest overnight. Was NPO after midnight. Plan for US this AM. Will continue to monitor.

## 2017-03-05 NOTE — DISCHARGE INSTRUCTIONS
Discharge Instruction for Undelivered Patients      You were seen for: Fetal Assessment  We Consulted: Dr. Hull  You had (Test or Medicine):monitoring, ultrasound     Diet:   Drink 8 to 12 glasses of liquids (milk, juice, water) every day.  You may eat meals and snacks.     Activity:  Call your doctor or nurse midwife if your baby is moving less than usual.     Call your provider if you notice:  Swelling in your face or increased swelling in your hands or legs.  Headaches that are not relieved by Tylenol (acetaminophen).  Changes in your vision (blurring: seeing spots or stars.)  Nausea (sick to your stomach) and vomiting (throwing up).   Weight gain of 5 pounds or more per week.  Heartburn that doesn't go away.  Signs of bladder infection: pain when you urinate (use the toilet), need to go more often and more urgently.  The bag of ribera (rupture of membranes) breaks, or you notice leaking in your underwear.  Bright red blood in your underwear.  Abdominal (lower belly) or stomach pain.  For first baby: Contractions (tightening) less than 5 minutes apart for one hour or more.  Second (plus) baby: Contractions (tightening) less than 10 minutes apart and getting stronger.  *If less than 34 weeks: Contractions (tightenings) more than 6 times in one hour.  Increase or change in vaginal discharge (note the color and amount)      Follow-up:  3/6/2017 in the [facility] 4th floor of the Professional building.

## 2017-03-05 NOTE — PLAN OF CARE
D: Written discharge instructions reviewed with the patient and states no questions. Patient verbalizes understand to return to labor and delivery if decrease fetal movement, contractions, bright red bleeding. Discharge to home. Follow up Monday at 0845 in the [facility] clinic.

## 2017-03-05 NOTE — DISCHARGE SUMMARY
[facility]  Maternal Fetal Medicine Discharge Summary    Admit date: 3/3/17  Discharge date: 3/5/17    Admit Diagnoses:   - 32 year old at 29w0d  - Mono-di twin gestation  - Stage 1 TTTS    Discharge Diagnoses:  - Same    Attending on admission: Dr. Hull    Attending on discharge: Dr. Hull    Procedures:  - Amnioreduction  - BMZ course    Admit HPI:  Ms. Julius Wilson is a 31 year old  at 29w0d, IUI pregnancy, with Monochorionic/Diamniotic twins who presents to L&D due to concern for PPROM vs. twin to twin transfusion syndrome. She was seen today for a BPP which showed polyhdramnios of twin 1 and anhydramnios in twin 2. UARs were also found to be elevated in twin 1. Bladders were seen in both babies. (See imaging report.) She denied any leakage of fluid, but did note symptoms including increased abdominal pressure as well as difficulty sleeping overnight. Julius also denies vaginal bleeding or contractions currently. +FMs.     Please see her admit H&P for full details of her PMH, PSH, Meds, Allergies and exam on admit.    Hospital course:  On hospital day #1, she had an amnio-reduction without any complication. After the procedure, she felt babies move, denies any painful regular contractions, no loss of fluid, and no vaginal bleeding. On hospital day#3, she had a repeat ultrasound that showed fetus one with BPP 8/8 with normal JAVIER and bladder seen and fetus two with BPP 6/8 given oligohydramnios and with bladder seen. She will follow up as below. Given return precautions.     Discharge/Disposition:  Julius Wilson was discharged to home in stable condition with the following instructions/medications. She has very close follow up with [facility] clinic the day after discharge for repeat ultrasound and fetal echoes. Patient will be discharged home.      Coral Gutierrez MD   OBGYN, PGY-3  3/5/2017 10:33 AM

## 2017-03-06 ENCOUNTER — OFFICE VISIT (OUTPATIENT)
Dept: MATERNAL FETAL MEDICINE | Facility: CLINIC | Age: 32
End: 2017-03-06
Attending: OBSTETRICS & GYNECOLOGY
Payer: COMMERCIAL

## 2017-03-06 ENCOUNTER — HOSPITAL ENCOUNTER (OUTPATIENT)
Dept: CARDIOLOGY | Facility: CLINIC | Age: 32
End: 2017-03-06
Attending: OBSTETRICS & GYNECOLOGY
Payer: COMMERCIAL

## 2017-03-06 ENCOUNTER — HOSPITAL ENCOUNTER (OUTPATIENT)
Dept: CARDIOLOGY | Facility: CLINIC | Age: 32
Discharge: HOME | End: 2017-03-06
Attending: OBSTETRICS & GYNECOLOGY | Admitting: OBSTETRICS & GYNECOLOGY
Payer: COMMERCIAL

## 2017-03-06 ENCOUNTER — TELEPHONE (OUTPATIENT)
Dept: MATERNAL FETAL MEDICINE | Facility: CLINIC | Age: 32
End: 2017-03-06

## 2017-03-06 ENCOUNTER — HOSPITAL ENCOUNTER (OUTPATIENT)
Dept: ULTRASOUND IMAGING | Facility: CLINIC | Age: 32
Discharge: HOME | End: 2017-03-06
Attending: OBSTETRICS & GYNECOLOGY | Admitting: OBSTETRICS & GYNECOLOGY
Payer: COMMERCIAL

## 2017-03-06 VITALS
HEIGHT: 65 IN | BODY MASS INDEX: 32.84 KG/M2 | WEIGHT: 197.1 LBS | HEART RATE: 91 BPM | DIASTOLIC BLOOD PRESSURE: 75 MMHG | RESPIRATION RATE: 22 BRPM | SYSTOLIC BLOOD PRESSURE: 106 MMHG

## 2017-03-06 DIAGNOSIS — O30.033 MONOCHORIONIC DIAMNIOTIC TWIN GESTATION IN THIRD TRIMESTER: ICD-10-CM

## 2017-03-06 DIAGNOSIS — O30.033 MONOCHORIONIC DIAMNIOTIC TWIN GESTATION IN THIRD TRIMESTER: Primary | ICD-10-CM

## 2017-03-06 PROCEDURE — 90472 IMMUNIZATION ADMIN EACH ADD: CPT

## 2017-03-06 PROCEDURE — 25000125 ZZHC RX 250

## 2017-03-06 PROCEDURE — 76819 FETAL BIOPHYS PROFIL W/O NST: CPT | Performed by: OBSTETRICS & GYNECOLOGY

## 2017-03-06 PROCEDURE — 25000128 H RX IP 250 OP 636

## 2017-03-06 PROCEDURE — 93325 DOPPLER ECHO COLOR FLOW MAPG: CPT

## 2017-03-06 PROCEDURE — 76816 OB US FOLLOW-UP PER FETUS: CPT | Mod: 59

## 2017-03-06 PROCEDURE — 90662 IIV NO PRSV INCREASED AG IM: CPT

## 2017-03-06 PROCEDURE — G0008 ADMIN INFLUENZA VIRUS VAC: HCPCS | Mod: ZF

## 2017-03-06 PROCEDURE — 90471 IMMUNIZATION ADMIN: CPT | Mod: ZF

## 2017-03-06 PROCEDURE — 76825 ECHO EXAM OF FETAL HEART: CPT

## 2017-03-06 PROCEDURE — 90715 TDAP VACCINE 7 YRS/> IM: CPT

## 2017-03-06 ASSESSMENT — PAIN SCALES - GENERAL: PAINLEVEL: NO PAIN (0)

## 2017-03-06 NOTE — TELEPHONE ENCOUNTER
LM with patient re: coordinating fetal ECHO in 2 wks- will plan to see patient 3/20 for RL2/OBV, 3/23 RL2/fetal ECHO (ok'd for 9am fetal echo with Dr Graham). [facility] call-back number left with patient to review updated schedule.

## 2017-03-06 NOTE — NURSING NOTE
Julius seen for RL2/UAR (TTTS check) and fetal ECHO at 29.3wks for mono/di twins, Stage 1 TTTS, s/p amnio reduction 3/3/17 (see report/notes). 1st OB Folder introduced to patient. Reviewed daily fetal kick counts, s/s of ptl and pre-e, triage numbers to call with questions/concerns. Dr Byrd discussed POC with patient. Plan for Julius to return 3/8 for RL2/UAR (TTTS check) at [facility], 3/10 RL2/UAR (TTTS check) at [facility]. Then she will start 2x/week TTTS check/UAR, growth q 2wks (next 3/17). 1st OBV 3/14. Repeat fetal ECHO in 2 wks. S/p NICU consult and BMZ as inpatient 3/3-3/5. TDAP and Flu vaccines administered today-see med note. S/p GCT. YOHAN letter and today's ultrasound and fetal ECHO reports will be faxed to [facility] today. Julius discharged stable and ambulatory. KEIKO IBARRA RN

## 2017-03-06 NOTE — MR AVS SNAPSHOT
After Visit Summary   3/6/2017    Julius Wilson    MRN: [id number]           Patient Information     Date Of Birth          1985        Visit Information        Provider Department      3/6/2017 9:15 AM Keely Byrd DO [facility] Maternal Fetal Medicine [facility]        Today's Diagnoses     Monochorionic diamniotic twin gestation in third trimester    -  1       Follow-ups after your visit        Additional Services     MFM Office Visit                 Your next 10 appointments already scheduled     Mar 08, 2017  3:00 PM CST   MFM US COMPRE TWINS F/U with [id number]   [facility] Maternal Fetal Medicine Ultrasound - [place] ([facility])    [address]  [facility] [id number] [phone]           Wear comfortable clothes and leave your valuables at home.            Mar 08, 2017  3:30 PM CST   Radiology MD with UR MFM MD   [facility] Maternal Fetal Medicine - [place] ([facility])    [address]  [facility] [address]   [phone]           Please arrive at the time given for your first appointment.  This visit is used internally to schedule the physician's time during your ultrasound.            Mar 10, 2017  3:00 PM CST   MFM US COMPRE TWINS F/U with RHMFMUSR2   [facility] Maternal Fetal Medicine Ultrasound - [facility])    [address]  [place] [id number] [phone]           Wear comfortable clothes and leave your valuables at home.            Mar 10, 2017  3:30 PM CST   Radiology MD with RH MFM MD   [facility] Maternal Fetal Medicine - [facility])    [address]  [facility] [address] [phone]           Please arrive at the time given for your first appointment.  This visit is used internally to schedule the physician's time during your ultrasound.            Mar 14, 2017  2:15 PM CDT   Ob First  Visit with UR EXAM RM 2   [facility] [place] ([facility])    [address]  [facility] [address]   [phone]            Mar 14, 2017  3:00 PM CDT   MFM US COMPRE TWINS F/U with [id number]   [facility] - [place] ([facility])    [address]  [facility] [address]   [phone]           Wear comfortable clothes and leave your valuables at home.            Mar 17, 2017  3:00 PM CDT   MFM US COMPRE TWINS F/U with URMFMUSR4   [facility] - [place] ([facility])    [address]  [facility] [address] [phone]           Wear comfortable clothes and leave your valuables at home.            Mar 17, 2017  3:30 PM CDT   Radiology MD with UR MFM MD   [facility] Maternal Fetal Medicine - [place] ([facility])    [address]  [facility] [address]   [phone]           Please arrive at the time given for your first appointment.  This visit is used internally to schedule the physician's time during your ultrasound.            Mar 20, 2017  2:15 PM CDT   MFM US COMPRE TWINS F/U with URMFMUSR3   [facility] Ultrasound - [place] ([facility])    [address]  [facility] [address]   [phone]           Wear comfortable clothes and leave your valuables at home.            Mar 20, 2017  3:00 PM CDT   Ob Follow Up with UR EXAM RM 2   [facility] [place] ([facility])    [address]  [facility] [address]   [phone]              Future tests that were ordered for you today     Open Standing Orders        Priority Remaining Interval Expires Ordered    [facility] Office Visit Routine 3/3  3/7/2018 3/6/2017    [facility] Office  "Visit Routine 1/1  3/7/2018 3/6/2017          Open Future Orders        Priority Expected Expires Ordered    [facility] US Comprehensive Twins F/U Routine  1/6/2018 3/6/2017    [facility] US Comprehensive Twins F/U Routine  1/6/2018 3/6/2017    Echo Fetal Complete Follow Up Twin B Routine  3/6/2018 3/6/2017    [facility] US Comprehensive Twins F/U Routine  1/6/2018 3/6/2017    [facility] US Comprehensive Twins F/U Routine  1/6/2018 3/6/2017    [facility] US Comprehensive Twins F/U Routine  1/6/2018 3/6/2017    Echo Fetal Complete Follow Up-Peds Cardiology Routine  3/6/2018 3/6/2017    Echo fetal complete - twin B Routine  3/6/2018 3/6/2017    Echo Fetal Complete Follow Up-Peds Cardiology Routine  3/6/2018 3/6/2017    Echo Fetal - Twin B Complete - Peds Cardiology Routine  3/6/2018 3/6/2017    [facility] US Comprehensive Twins F/U Routine 3/6/2017 1/5/2018 3/5/2017            Who to contact     If you have questions or need follow up information about today's clinic visit or your schedule please contact [facility] directly at [phone].  Normal or non-critical lab and imaging results will be communicated to you by Mer[employer], letter or phone within 4 business days after the clinic has received the results. If you do not hear from us within 7 days, please contact the clinic through Merush[employer] or phone. If you have a critical or abnormal lab result, we will notify you by phone as soon as possible.  Submit refill requests through [employer] or call your pharmacy and they will forward the refill request to us. Please allow 3 business days for your refill to be completed.          Additional Information About Your Visit        Mer[employer] Information     [employer] lets you send messages to your doctor, view your test results, renew your prescriptions, schedule appointments and more. To sign up, go to [email or web address] . Click on \"Log in\" on the left side of the screen, which will take you to the Welcome page. Then click on \"Sign up " "Now\" on the right side of the page.     You will be asked to enter the access code listed below, as well as some personal information. Please follow the directions to create your username and password.     Your access code is: [email or web address]  Expires: 2017  3:15 PM     Your access code will  in 90 days. If you need help or a new code, please call your [facility] clinic or [phone].        Care EveryWhere ID     This is your Care EveryWhere ID. This could be used by other organizations to access your [facility] medical records  [id number]        Your Vitals Were     Pulse Respirations Height Last Period BMI (Body Mass Index)       91 22 1.651 m (5' 5\") 2016 32.8 kg/m2        Blood Pressure from Last 3 Encounters:   17 106/75   17 102/66    Weight from Last 3 Encounters:   17 89.4 kg (197 lb 1.6 oz)              We Performed the Following     HC FLU VAC PRESRV FREE QUAD SPLIT VIR 3+YRS IM     TDAP (ADACEL AGES 11-64)          Today's Medication Changes          These changes are accurate as of: 3/6/17  5:10 PM.  If you have any questions, ask your nurse or doctor.               Start taking these medicines.        Dose/Directions    influenza quadrivalent (PF) vacc age 3 yrs and older 0.5 ML injection   Commonly known as:  FLUZONE or Flulaval   Used for:  Monochorionic diamniotic twin gestation in third trimester   Started by:  Keely Byrd DO        Dose:  0.5 mL   Inject 0.5 mLs into the muscle once for 1 dose   Quantity:  0.5 mL   Refills:  0       Tdap (tetanus-diphtheria-acell pertussis) 5-2-15.5 LF-MCG/0.5 injection   Commonly known as:  ADACEL   Used for:  Monochorionic diamniotic twin gestation in third trimester   Started by:  Keely Byrd DO        Dose:  0.5 mL   Inject 0.5 mLs into the muscle once for 1 dose   Quantity:  0.5 mL   Refills:  0            Where to get your medicines      These medications were sent to [facility]  [address] [facility] [address]     Phone:  [phone]     influenza quadrivalent (PF) vacc age 3 yrs and older 0.5 ML injection    Tdap (tetanus-diphtheria-acell pertussis) 5-2-15.5 LF-MCG/0.5 injection                Primary Care Provider    No Pcp Confirmed       No address on file        Thank you!     Thank you for choosing [facility]  for your care. Our goal is always to provide you with excellent care. Hearing back from our patients is one way we can continue to improve our services. Please take a few minutes to complete the written survey that you may receive in the mail after your visit with us. Thank you!             Your Updated Medication List - Protect others around you: Learn how to safely use, store and throw away your medicines at www.disposemymeds.org.          This list is accurate as of: 3/6/17  5:10 PM.  Always use your most recent med list.                   Brand Name Dispense Instructions for use    calcium carbonate 500 MG tablet    OS-TIARRA 500 mg [place]. [place]     Take 500 mg by mouth 2 times daily       folic acid 1 MG tablet    FOLVITE         influenza quadrivalent (PF) vacc age 3 yrs and older 0.5 ML injection    FLUZONE or Flulaval    0.5 mL    Inject 0.5 mLs into the muscle once for 1 dose       IRON SUPPLEMENT PO      Take 325 mg by mouth 2 times daily (with meals)       prenatal multivitamin  plus iron 27-0.8 MG Tabs per tablet      Take 1 tablet by mouth daily       Tdap (tetanus-diphtheria-acell pertussis) 5-2-15.5 LF-MCG/0.5 injection    ADACEL    0.5 mL    Inject 0.5 mLs into the muscle once for 1 dose       VITAMIN D (CHOLECALCIFEROL) PO      Take 2,000 Units by mouth daily

## 2017-03-06 NOTE — PROGRESS NOTES
"Please see \"Imaging\" tab under \"Chart Review\" for details of today's US.    Keely Byrd, DO    "

## 2017-03-06 NOTE — LETTER
"March 6, 2017      Chelsea Garcia,    Your patient, Julius Wilson, 1985[facility]. Her pregnancy has been found to be complicated by mono/di twin gestation, Stage 1 TTTS.  A detailed copy of her ultrasound report and fetal ECHO's are being sent to you.  After reviewing Julius's case, Dr. Byrd recommends delivery at [facility].  Dr. Byrd requests Julius transfer all prenatal care to [facility] immediately  for optimal care coordination.  Please send the following to [facility] (Fax [phone], ATTN:Dominique):      a written \"Transfer of Care\" order (this can be done through [employer] or on our service request form)    Feel free to contact us with any questions/concerns.      Sincerely,      DOMINIQUE IBARRA    Patient Care Coordinator  [facility]  Phone: [phone]  Fax: [phone]  "

## 2017-03-08 ENCOUNTER — HOSPITAL ENCOUNTER (OUTPATIENT)
Dept: ULTRASOUND IMAGING | Facility: CLINIC | Age: 32
Discharge: HOME | End: 2017-03-08
Attending: OBSTETRICS & GYNECOLOGY | Admitting: OBSTETRICS & GYNECOLOGY
Payer: COMMERCIAL

## 2017-03-08 ENCOUNTER — OFFICE VISIT (OUTPATIENT)
Dept: MATERNAL FETAL MEDICINE | Facility: CLINIC | Age: 32
End: 2017-03-08
Attending: OBSTETRICS & GYNECOLOGY
Payer: COMMERCIAL

## 2017-03-08 DIAGNOSIS — O43.023 TWIN TO TWIN TRANSFUSION IN THIRD TRIMESTER: ICD-10-CM

## 2017-03-08 DIAGNOSIS — O30.033 MONOCHORIONIC DIAMNIOTIC TWIN GESTATION IN THIRD TRIMESTER: Primary | ICD-10-CM

## 2017-03-08 DIAGNOSIS — O30.033 MONOCHORIONIC DIAMNIOTIC TWIN GESTATION IN THIRD TRIMESTER: ICD-10-CM

## 2017-03-08 PROCEDURE — 76816 OB US FOLLOW-UP PER FETUS: CPT | Mod: 59

## 2017-03-08 PROCEDURE — 76819 FETAL BIOPHYS PROFIL W/O NST: CPT | Performed by: OBSTETRICS & GYNECOLOGY

## 2017-03-08 NOTE — PROGRESS NOTES
"Please see \"Imaging\" tab under \"Chart Review\" for details of today's US.      Karin Castillo, DO  Maternal-Fetal Medicine  March 8, 2017      "

## 2017-03-08 NOTE — MR AVS SNAPSHOT
After Visit Summary   3/8/2017    Julius Wilson    MRN: [id number]           Patient Information     Date Of Birth          1985        Visit Information        Provider Department      3/8/2017 3:30 PM Karin Castillo,  [facility] Maternal Fetal Medicine - [place]        Today's Diagnoses     Monochorionic diamniotic twin gestation in third trimester    -  1    Twin to twin transfusion in third trimester           Follow-ups after your visit        Your next 10 appointments already scheduled     Mar 10, 2017  3:00 PM CST   MFM US COMPRE TWINS F/U with [id number]   [facility] Maternal Fetal Medicine Ultrasound - [facility])    [address]  [facility] [id number] [phone]           Wear comfortable clothes and leave your valuables at home.            Mar 10, 2017  3:30 PM CST   Radiology MD with RH MFM MD   [facility] Maternal Fetal Medicine - [facility])    [address]  [facility] [id number] [phone]           Please arrive at the time given for your first appointment.  This visit is used internally to schedule the physician's time during your ultrasound.            Mar 14, 2017  2:15 PM CDT   Ob First Visit with UR EXAM RM 2   [facility] Maternal Fetal Medicine - [facility])    [address]  [facility] [address]   [phone]            Mar 14, 2017  3:00 PM CDT   MFM US COMPRE TWINS F/U with URMFMUSR2   [facility] Maternal Fetal Medicine Ultrasound - [facility])    [address]  [facility] [address]   [phone]           Wear comfortable clothes and leave your valuables at home.            Mar 17, 2017  3:00 PM CDT   MFM US COMPRE TWINS F/U with URMFMUSR4   [facility] Maternal Fetal Medicine Ultrasound - [place] ([facility])    [address]  [facility] [id number] [phone]           Wear comfortable clothes and leave your valuables at home.            Mar 17, 2017  3:30 PM CDT   Radiology MD with ANDREW FARIAS MD   [facility] Maternal Fetal Medicine - [place] ([facility])    [address]  [facility] [address] [phone]           Please arrive at the time given for your first appointment.  This visit is used internally to schedule the physician's time during your ultrasound.            Mar 20, 2017  3:00 PM CDT   M US COMPRE TWINS F/U with URMFMUSR3   [facility] Maternal Fetal Medicine Ultrasound - [place] ([facility])    [address]  [facility] [id number] [phone]           Wear comfortable clothes and leave your valuables at home.            Mar 20, 2017  3:30 PM CDT   Radiology MD with ANDREW FARIAS MD   [facility] Maternal Fetal Medicine - [place] ([facility])    [address]  [facility] [address]   [phone]           Please arrive at the time given for your first appointment.  This visit is used internally to schedule the physician's time during your ultrasound.            Mar 23, 2017  9:00 AM CDT   Ech Fetal Follow-Up* with URFETR1   [facility] Echo/EKG ([facility])    [address] [facility] [address]               Mar 23, 2017 10:00 AM CDT   Ech Fetal -Twin B Complete* with URFETR1   [facility] Echo/EKG ([facility])    [facility] [address]                 Who to contact     If you have questions or need follow up information about today's clinic visit or your schedule please contact [facility] MATERNAL FETAL MEDICINE [facility] directly at [phone].  Normal or non-critical lab and imaging results will be communicated to you by MyChart, letter or phone within 4 business days after the clinic has received the results. If you  "do not hear from us within 7 days, please contact the clinic through [employer] or phone. If you have a critical or abnormal lab result, we will notify you by phone as soon as possible.  Submit refill requests through [employer] or call your pharmacy and they will forward the refill request to us. Please allow 3 business days for your refill to be completed.          Additional Information About Your Visit        [employer] Information     [employer] lets you send messages to your doctor, view your test results, renew your prescriptions, schedule appointments and more. To sign up, go to [email or web address] . Click on \"Log in\" on the left side of the screen, which will take you to the Welcome page. Then click on \"Sign up Now\" on the right side of the page.     You will be asked to enter the access code listed below, as well as some personal information. Please follow the directions to create your username and password.     Your access code is: [email or web address]  Expires: 2017  3:15 PM     Your access code will  in 90 days. If you need help or a new code, please call your [place] clinic or [phone].        Care EveryWhere ID     This is your Care EveryWhere ID. This could be used by other organizations to access your [place] medical records  [id number]        Your Vitals Were     Last Period                   2016            Blood Pressure from Last 3 Encounters:   17 106/75   17 102/66    Weight from Last 3 Encounters:   17 89.4 kg (197 lb 1.6 oz)              Today, you had the following     No orders found for display       Primary Care Provider    No Pcp Confirmed       No address on file        Thank you!     Thank you for choosing [facility]  for your care. Our goal is always to provide you with excellent care. Hearing back from our patients is one way we can continue to improve our services. Please take a few minutes to complete the written survey " that you may receive in the mail after your visit with us. Thank you!             Your Updated Medication List - Protect others around you: Learn how to safely use, store and throw away your medicines at www.disposemymeds.org.          This list is accurate as of: 3/8/17  4:06 PM.  Always use your most recent med list.                   Brand Name Dispense Instructions for use    calcium carbonate 500 MG tablet    OS-TIARRA 500 mg [place]     Take 500 mg by mouth 2 times daily       folic acid 1 MG tablet    FOLVITE         IRON SUPPLEMENT PO      Take 325 mg by mouth 2 times daily (with meals)       prenatal multivitamin  plus iron 27-0.8 MG Tabs per tablet      Take 1 tablet by mouth daily       VITAMIN D (CHOLECALCIFEROL) PO      Take 2,000 Units by mouth daily

## 2017-03-10 ENCOUNTER — OFFICE VISIT (OUTPATIENT)
Dept: MATERNAL FETAL MEDICINE | Facility: CLINIC | Age: 32
End: 2017-03-10
Attending: OBSTETRICS & GYNECOLOGY
Payer: COMMERCIAL

## 2017-03-10 ENCOUNTER — HOSPITAL ENCOUNTER (OUTPATIENT)
Dept: ULTRASOUND IMAGING | Facility: CLINIC | Age: 32
Discharge: HOME | End: 2017-03-10
Attending: OBSTETRICS & GYNECOLOGY | Admitting: OBSTETRICS & GYNECOLOGY
Payer: COMMERCIAL

## 2017-03-10 DIAGNOSIS — O40.3XX1 POLYHYDRAMNIOS IN THIRD TRIMESTER, FETUS 1: ICD-10-CM

## 2017-03-10 DIAGNOSIS — O30.033 MONOCHORIONIC DIAMNIOTIC TWIN GESTATION IN THIRD TRIMESTER: ICD-10-CM

## 2017-03-10 DIAGNOSIS — O30.033 MONOCHORIONIC DIAMNIOTIC TWIN GESTATION IN THIRD TRIMESTER: Primary | ICD-10-CM

## 2017-03-10 PROCEDURE — 76816 OB US FOLLOW-UP PER FETUS: CPT | Mod: 59

## 2017-03-10 PROCEDURE — 76819 FETAL BIOPHYS PROFIL W/O NST: CPT | Performed by: OBSTETRICS & GYNECOLOGY

## 2017-03-10 NOTE — MR AVS SNAPSHOT
After Visit Summary   3/10/2017    Julius Wilson    MRN: [id number]           Patient Information     Date Of Birth          1985        Visit Information        Provider Department      3/10/2017 3:30 PM Miller Perez MD [facility] Maternal Fetal Medicine [facility]        Today's Diagnoses     Monochorionic diamniotic twin gestation in third trimester    -  1       Follow-ups after your visit        Your next 10 appointments already scheduled     Mar 13, 2017  8:45 AM CDT   MFM US COMPRE TWINS F/U with SHMFMUSR3   [facility] Maternal Fetal Medicine Ultrasound - [facility] ([facility])    [address] [place] [address] [address]  [place] [address] [phone]           Wear comfortable clothes and leave your valuables at home.            Mar 13, 2017  9:15 AM CDT   Radiology MD with  DINORA MAHONEY   [facility] Maternal Fetal Medicine - [facility] ([facility])    [address] [facility] [address]  [place] [address] [phone]           Please arrive at the time given for your first appointment.  This visit is used internally to schedule the physician's time during your ultrasound.            Mar 15, 2017  1:30 PM CDT   MFM US COMPRE TWINS F/U with URMFMUSR4   [facility] Maternal Fetal Medicine Ultrasound - [facility])    [address]  [facility] [address] [phone]           Wear comfortable clothes and leave your valuables at home.            Mar 15, 2017  2:15 PM CDT   Ob First Visit with UR EXAM RM 2   [facility] - [facility])    [address]  [facility] [address]   [phone]            Mar 17, 2017  3:00 PM CDT   MFM US COMPRE TWINS F/U with URMFMUSR4   [facility]ound - [place] ([facility])    [address]  [facility] [address]   [phone]            Wear comfortable clothes and leave your valuables at home.            Mar 17, 2017  3:30 PM CDT   Radiology MD with ANDREW FARIAS MD   [facility] Maternal Fetal Medicine - [place] ([facility])    [address]  [facility] [address]   [phone]           Please arrive at the time given for your first appointment.  This visit is used internally to schedule the physician's time during your ultrasound.            Mar 20, 2017  3:00 PM CDT   [facility] US COMPRE TWINS F/U with URMFMUSR3   [facility] Maternal Fetal Medicine Ultrasound - [place] ([facility])    [address]  [facility] [address] [phone]           Wear comfortable clothes and leave your valuables at home.            Mar 20, 2017  3:30 PM CDT   Radiology MD with ANDREW FARIAS MD   [facility] Maternal Fetal Medicine - [place] ([facility])    [address]  [facility] [address]   [phone]           Please arrive at the time given for your first appointment.  This visit is used internally to schedule the physician's time during your ultrasound.            Mar 23, 2017  9:00 AM CDT   Ech Fetal Follow-Up* with URFETR1   [facility] Echo[facility] ([facility])    [address]  [place] [address]               Mar 23, 2017 10:00 AM CDT   Ech Fetal -Twin B Complete* with URFETR1   [facility] ([facility])    [address]  [place] [address]                 Future tests that were ordered for you today     Open Future Orders        Priority Expected Expires Ordered    [facility] US Comprehensive Twins F/U Routine  1/10/2018 3/10/2017            Who to contact     If you have questions or need follow up information about today's clinic visit or your schedule please contact [facility] [place] [facility] directly at [phone].  Normal or non-critical lab and imaging  "results will be communicated to you by MyChart, letter or phone within 4 business days after the clinic has received the results. If you do not hear from us within 7 days, please contact the clinic through [employer] or phone. If you have a critical or abnormal lab result, we will notify you by phone as soon as possible.  Submit refill requests through [employer] or call your pharmacy and they will forward the refill request to us. Please allow 3 business days for your refill to be completed.          Additional Information About Your Visit        [employer] Information     [employer] lets you send messages to your doctor, view your test results, renew your prescriptions, schedule appointments and more. To sign up, go to [email or web address] . Click on \"Log in\" on the left side of the screen, which will take you to the Welcome page. Then click on \"Sign up Now\" on the right side of the page.     You will be asked to enter the access code listed below, as well as some personal information. Please follow the directions to create your username and password.     Your access code is: [email or web address]  Expires: 2017  3:15 PM     Your access code will  in 90 days. If you need help or a new code, please call your [place] clinic or [phone].        Care EveryWhere ID     This is your Care EveryWhere ID. This could be used by other organizations to access your [place] medical records  [id number]        Your Vitals Were     Last Period                   2016            Blood Pressure from Last 3 Encounters:   17 106/75   17 102/66    Weight from Last 3 Encounters:   17 89.4 kg (197 lb 1.6 oz)              Today, you had the following     No orders found for display       Primary Care Provider    No Pcp Confirmed       No address on file        Thank you!     Thank you for choosing [facility] [place] [facility]  for your care. Our goal is always to provide you with excellent care. Hearing " back from our patients is one way we can continue to improve our services. Please take a few minutes to complete the written survey that you may receive in the mail after your visit with us. Thank you!             Your Updated Medication List - Protect others around you: Learn how to safely use, store and throw away your medicines at www.disposemymeds.org.          This list is accurate as of: 3/10/17  4:53 PM.  Always use your most recent med list.                   Brand Name Dispense Instructions for use    calcium carbonate 500 MG tablet    OS-TIARRA 500 mg [place]. Ca     Take 500 mg by mouth 2 times daily       folic acid 1 MG tablet    FOLVITE         IRON SUPPLEMENT PO      Take 325 mg by mouth 2 times daily (with meals)       prenatal multivitamin  plus iron 27-0.8 MG Tabs per tablet      Take 1 tablet by mouth daily       VITAMIN D (CHOLECALCIFEROL) PO      Take 2,000 Units by mouth daily

## 2017-03-13 ENCOUNTER — OFFICE VISIT (OUTPATIENT)
Dept: MATERNAL FETAL MEDICINE | Facility: CLINIC | Age: 32
End: 2017-03-13
Attending: OBSTETRICS & GYNECOLOGY
Payer: COMMERCIAL

## 2017-03-13 ENCOUNTER — HOSPITAL ENCOUNTER (OUTPATIENT)
Dept: ULTRASOUND IMAGING | Facility: CLINIC | Age: 32
Discharge: HOME | End: 2017-03-13
Attending: OBSTETRICS & GYNECOLOGY | Admitting: OBSTETRICS & GYNECOLOGY
Payer: COMMERCIAL

## 2017-03-13 DIAGNOSIS — O30.033 MONOCHORIONIC DIAMNIOTIC TWIN GESTATION IN THIRD TRIMESTER: ICD-10-CM

## 2017-03-13 DIAGNOSIS — O43.023 TWIN TO TWIN TRANSFUSION IN THIRD TRIMESTER: ICD-10-CM

## 2017-03-13 DIAGNOSIS — O30.033 MONOCHORIONIC DIAMNIOTIC TWIN GESTATION IN THIRD TRIMESTER: Primary | ICD-10-CM

## 2017-03-13 PROCEDURE — 76816 OB US FOLLOW-UP PER FETUS: CPT | Mod: 59

## 2017-03-13 PROCEDURE — 76819 FETAL BIOPHYS PROFIL W/O NST: CPT | Performed by: OBSTETRICS & GYNECOLOGY

## 2017-03-13 NOTE — MR AVS SNAPSHOT
After Visit Summary   3/13/2017    Julius Wilson    MRN: [id number]           Patient Information     Date Of Birth          1985        Visit Information        Provider Department      3/13/2017 9:15 AM Miller Perez MD [facility] Maternal Fetal Medicine [facility]        Today's Diagnoses     Monochorionic diamniotic twin gestation in third trimester    -  1    Twin to twin transfusion in third trimester           Follow-ups after your visit        Your next 10 appointments already scheduled     Mar 15, 2017  1:30 PM CDT   MFM US COMPRE TWINS F/U with URMFMUSR4   [facility] - [facility])    [address]  [facility] [address] [phone]           Wear comfortable clothes and leave your valuables at home.            Mar 15, 2017  2:15 PM CDT   Ob First Visit with UR EXAM RM 2   [facility] Maternal Fetal Medicine - [place] ([facility])    [address]  [facility] [address]   [phone]            Mar 17, 2017  3:00 PM CDT   MFM US COMPRE TWINS F/U with URMFMUSR4   [facility] M[facility] Fetal Medicine Ultrasound - [place] ([facility])    [address]  [facility] [address] [phone]           Wear comfortable clothes and leave your valuables at home.            Mar 17, 2017  3:30 PM CDT   Radiology MD with UR DINORA MAHONEY   [facility] Maternal Fetal Medicine - [facility])    [address]  [facility] [address]   [phone]           Please arrive at the time given for your first appointment.  This visit is used internally to schedule the physician's time during your ultrasound.            Mar 20, 2017  3:00 PM CDT   MFM US COMPRE TWINS F/U with URMFMUSR3   [facility] Maternal Fetal Medicine Ultrasound - [facility]  [facility])    [address]  [facility] [address] [phone]           Wear comfortable clothes and leave your valuables at home.            Mar 20, 2017  3:30 PM CDT   Radiology MD with ANDREW FARIAS MD   [facility]aternal [facility]etal Medicine - [place] ([facility])    [address]  [facility] [address]   [phone]           Please arrive at the time given for your first appointment.  This visit is used internally to schedule the physician's time during your ultrasound.            Mar 23, 2017  9:00 AM CDT   Ech Fetal Follow-Up* with URFETR1   [facility] ([facility])    [address]  [facility] [address]               Mar 23, 2017 10:00 AM CDT   Ech Fetal -Twin B Complete* with URFETR1   [facility] ([facility])    [address]  [facility] [address]               Mar 23, 2017 10:15 AM CDT   MFM US COMPRE TWINS F/U with URMFMUSR4   [facility] Maternal Fetal Medicine Ultrasound - [place] ([facility])    [address]  [facility] [address] [phone]           Wear comfortable clothes and leave your valuables at home.            Mar 23, 2017 10:45 AM CDT   Radiology MD with ANDREW FARIAS MD   [facility] Maternal Fetal Medicine - [facility])    [address]  [facility] [address]   [phone]           Please arrive at the time given for your first appointment.  This visit is used internally to schedule the physician's time during your ultrasound.              Who to contact     If you have questions or need follow up information about today's clinic visit or your schedule please contact [facility]  [place] directly at [phone].  Normal or non-critical lab and imaging results will be communicated to you by MyChart, letter or phone within 4 business days after the clinic  "has received the results. If you do not hear from us within 7 days, please contact the clinic through [employer] or phone. If you have a critical or abnormal lab result, we will notify you by phone as soon as possible.  Submit refill requests through [employer] or call your pharmacy and they will forward the refill request to us. Please allow 3 business days for your refill to be completed.          Additional Information About Your Visit        [employer] Information     [employer] lets you send messages to your doctor, view your test results, renew your prescriptions, schedule appointments and more. To sign up, go to [email or web address] . Click on \"Log in\" on the left side of the screen, which will take you to the Welcome page. Then click on \"Sign up Now\" on the right side of the page.     You will be asked to enter the access code listed below, as well as some personal information. Please follow the directions to create your username and password.     Your access code is: [email or web address]  Expires: 2017  4:15 PM     Your access code will  in 90 days. If you need help or a new code, please call your [place] clinic or [phone].        Care EveryWhere ID     This is your Care EveryWhere ID. This could be used by other organizations to access your [place] medical records  [id number]        Your Vitals Were     Last Period                   2016            Blood Pressure from Last 3 Encounters:   17 106/75   17 102/66    Weight from Last 3 Encounters:   17 89.4 kg (197 lb 1.6 oz)              Today, you had the following     No orders found for display       Primary Care Provider    No Pcp Confirmed       No address on file        Thank you!     Thank you for choosing [facility]  for your care. Our goal is always to provide you with excellent care. Hearing back from our patients is one way we can continue to improve our services. Please take a few minutes " to complete the written survey that you may receive in the mail after your visit with us. Thank you!             Your Updated Medication List - Protect others around you: Learn how to safely use, store and throw away your medicines at www.disposemymeds.org.          This list is accurate as of: 3/13/17 11:59 PM.  Always use your most recent med list.                   Brand Name Dispense Instructions for use    calcium carbonate 500 MG tablet    OS-TIARRA 500 mg [place]. Ca     Take 500 mg by mouth 2 times daily       folic acid 1 MG tablet    FOLVITE         IRON SUPPLEMENT PO      Take 325 mg by mouth 2 times daily (with meals)       prenatal multivitamin  plus iron 27-0.8 MG Tabs per tablet      Take 1 tablet by mouth daily       VITAMIN D (CHOLECALCIFEROL) PO      Take 2,000 Units by mouth daily

## 2017-03-15 ENCOUNTER — HOSPITAL ENCOUNTER (OUTPATIENT)
Dept: ULTRASOUND IMAGING | Facility: CLINIC | Age: 32
Discharge: HOME | End: 2017-03-15
Attending: OBSTETRICS & GYNECOLOGY | Admitting: OBSTETRICS & GYNECOLOGY
Payer: COMMERCIAL

## 2017-03-15 ENCOUNTER — OFFICE VISIT (OUTPATIENT)
Dept: MATERNAL FETAL MEDICINE | Facility: CLINIC | Age: 32
End: 2017-03-15
Attending: OBSTETRICS & GYNECOLOGY
Payer: COMMERCIAL

## 2017-03-15 VITALS
RESPIRATION RATE: 18 BRPM | BODY MASS INDEX: 32.3 KG/M2 | DIASTOLIC BLOOD PRESSURE: 71 MMHG | SYSTOLIC BLOOD PRESSURE: 108 MMHG | HEART RATE: 89 BPM | WEIGHT: 194.1 LBS

## 2017-03-15 DIAGNOSIS — O30.033 MONOCHORIONIC DIAMNIOTIC TWIN GESTATION IN THIRD TRIMESTER: ICD-10-CM

## 2017-03-15 DIAGNOSIS — O09.93 SUPERVISION OF HIGH RISK PREGNANCY IN THIRD TRIMESTER: Primary | ICD-10-CM

## 2017-03-15 DIAGNOSIS — O43.023 TWIN TO TWIN TRANSFUSION IN THIRD TRIMESTER: ICD-10-CM

## 2017-03-15 PROCEDURE — 99211 OFF/OP EST MAY X REQ PHY/QHP: CPT | Mod: 25,ZF

## 2017-03-15 PROCEDURE — 76816 OB US FOLLOW-UP PER FETUS: CPT | Mod: 59

## 2017-03-15 PROCEDURE — 76819 FETAL BIOPHYS PROFIL W/O NST: CPT | Performed by: OBSTETRICS & GYNECOLOGY

## 2017-03-15 RX ORDER — CALCIUM CARBONATE 500 MG/1
1 TABLET, CHEWABLE ORAL DAILY
COMMUNITY
End: 2021-11-29

## 2017-03-15 NOTE — NURSING NOTE
Julius presents to [facility] with her significant other Gallito for assessment and evaluation of pregnancy c/b mono/di twins; TTTS, s/p amnioreduction on 3/3/17. Julius seen in clinic today for TTTS check, BPP, and 1st OB visit at 30w5d gestation (see report/notes). VSS. Pt denies bldg/lof/change in discharge/contractions/headache/vision changes/chest pain/SOB/edema. Dr. Sauceda and Dr. Perez met with pt and discussed POC. Plan to return to [facility] on Friday 3/17/16. Reviewed s/sx of PTL, fetal kick counts, and phone numbers to call. Pt discharged stable and ambulatory. Plan weekly OB visits with 2x/week TTTS/BPP.    Geraldine Tee RN  RN F:F 15min

## 2017-03-15 NOTE — MR AVS SNAPSHOT
After Visit Summary   3/15/2017    Julius Wilson    MRN: [id number]           Patient Information     Date Of Birth          1985        Visit Information        Provider Department      3/15/2017 2:15 PM Miller Perez MD [facility] Maternal Fetal Medicine - [place]        Today's Diagnoses     Supervision of high risk pregnancy in third trimester    -  1    Monochorionic diamniotic twin gestation in third trimester        Twin to twin transfusion in third trimester           Follow-ups after your visit        Your next 10 appointments already scheduled     Mar 17, 2017  3:00 PM CDT   MFM US COMPRE TWINS F/U with URMFMUSR4   [facility] Maternal Fetal Medicine Ultrasound - [facility])    [address]  [facility] [address]   [phone]           Wear comfortable clothes and leave your valuables at home.            Mar 17, 2017  3:30 PM CDT   Radiology MD with ANDREW FARIAS MD   [facility] Maternal Fetal Medicine - [facility])    [address]  [facility] [address]   [phone]           Please arrive at the time given for your first appointment.  This visit is used internally to schedule the physician's time during your ultrasound.            Mar 20, 2017  3:00 PM CDT   MFM US COMPRE TWINS F/U with URMFMUSR3   [facility] Maternal Fetal Medicine Ultrasound - [place] ([facility])    [address]  [facility] [address] [phone]           Wear comfortable clothes and leave your valuables at home.            Mar 20, 2017  3:30 PM CDT   Radiology MD with ANDREW FARIAS MD   [facility] Maternal Fetal Medicine - [facility])    [address]  [facility] [address]   [phone]           Please arrive at the time given for your first appointment.  This visit is used internally to schedule the physician's  time during your ultrasound.            Mar 23, 2017  9:00 AM CDT   Ech Fetal Follow-Up* with URFETR1   [facility] ([facility])    [address]  [facility] [place] [address]               Mar 23, 2017 10:00 AM CDT   Ech Fetal -Twin B Complete* with URFETR1   [facility] ([facility])    [address]  [facility] [place] [address]               Mar 23, 2017 10:15 AM CDT   MFM US COMPRE TWINS F/U with URMFMUSR4   [facility] Maternal Fetal Medicine Ultrasound - [place] ([facility])    [address]  [facility] [address] [phone]           Wear comfortable clothes and leave your valuables at home.            Mar 23, 2017 10:45 AM CDT   Radiology MD with UR DINORA MAHONEY   [facility] Maternal Fetal Medicine - [place] ([facility])    [address]  [facility] [address]   [phone]           Please arrive at the time given for your first appointment.  This visit is used internally to schedule the physician's time during your ultrasound.            Mar 28, 2017  2:15 PM CDT   MFM US COMPRE TWINS F/U with URMFMUSR2   [facility] Maternal Fetal Medicine Ultrasound - [place] ([facility])    [address]  [facility] [address] [phone]           Wear comfortable clothes and leave your valuables at home.            Mar 28, 2017  3:00 PM CDT   Ob Follow Up with UR EXAM RM 1   [facility] Maternal Fetal Medicine [facility] ([facility])    [address]  [facility] [address]   [phone]              Who to contact     If you have questions or need follow up information about today's clinic visit or your schedule please contact [facility] FETAL MEDIC[facility] directly at [phone].  Normal or non-critical lab and imaging results will be communicated to you by [place]  "letter or phone within 4 business days after the clinic has received the results. If you do not hear from us within 7 days, please contact the clinic through [employer] or phone. If you have a critical or abnormal lab result, we will notify you by phone as soon as possible.  Submit refill requests through [employer] or call your pharmacy and they will forward the refill request to us. Please allow 3 business days for your refill to be completed.          Additional Information About Your Visit        [employer] Information     [employer] lets you send messages to your doctor, view your test results, renew your prescriptions, schedule appointments and more. To sign up, go to [email or web address] . Click on \"Log in\" on the left side of the screen, which will take you to the Welcome page. Then click on \"Sign up Now\" on the right side of the page.     You will be asked to enter the access code listed below, as well as some personal information. Please follow the directions to create your username and password.     Your access code is: [email or web address]  Expires: 2017  4:15 PM     Your access code will  in 90 days. If you need help or a new code, please call your [place] clinic or [phone].        Care EveryWhere ID     This is your Care EveryWhere ID. This could be used by other organizations to access your [place] medical records  [id number]        Your Vitals Were     Pulse Respirations Last Period BMI (Body Mass Index)          89 18 2016 32.3 kg/m2         Blood Pressure from Last 3 Encounters:   03/15/17 108/71   17 106/75   17 102/66    Weight from Last 3 Encounters:   03/15/17 88 kg (194 lb 1.6 oz)   17 89.4 kg (197 lb 1.6 oz)              We Performed the Following     MFM Office Visit        Primary Care Provider    No Pcp Confirmed       No address on file        Thank you!     Thank you for choosing [facility]  for your care. Our goal is always to " provide you with excellent care. Hearing back from our patients is one way we can continue to improve our services. Please take a few minutes to complete the written survey that you may receive in the mail after your visit with us. Thank you!             Your Updated Medication List - Protect others around you: Learn how to safely use, store and throw away your medicines at www.disposemymeds.org.          This list is accurate as of: 3/15/17  5:22 PM.  Always use your most recent med list.                   Brand Name Dispense Instructions for use    calcium carbonate 500 MG chewable tablet    TUMS     Take 1 chew tab by mouth daily       calcium carbonate 500 MG tablet    OS-TIARRA 500 mg [place]. Ca     Take 500 mg by mouth 2 times daily       folic acid 1 MG tablet    FOLVITE         IRON SUPPLEMENT PO      Take 325 mg by mouth 2 times daily (with meals)       prenatal multivitamin  plus iron 27-0.8 MG Tabs per tablet      Take 1 tablet by mouth daily       VITAMIN D (CHOLECALCIFEROL) PO      Take 2,000 Units by mouth daily

## 2017-03-17 ENCOUNTER — OFFICE VISIT (OUTPATIENT)
Dept: MATERNAL FETAL MEDICINE | Facility: CLINIC | Age: 32
End: 2017-03-17
Attending: OBSTETRICS & GYNECOLOGY
Payer: COMMERCIAL

## 2017-03-17 ENCOUNTER — HOSPITAL ENCOUNTER (OUTPATIENT)
Dept: ULTRASOUND IMAGING | Facility: CLINIC | Age: 32
Discharge: HOME | End: 2017-03-17
Attending: OBSTETRICS & GYNECOLOGY | Admitting: OBSTETRICS & GYNECOLOGY
Payer: COMMERCIAL

## 2017-03-17 DIAGNOSIS — O30.033 MONOCHORIONIC DIAMNIOTIC TWIN GESTATION IN THIRD TRIMESTER: ICD-10-CM

## 2017-03-17 DIAGNOSIS — O40.3XX1 POLYHYDRAMNIOS IN THIRD TRIMESTER, FETUS 1: ICD-10-CM

## 2017-03-17 DIAGNOSIS — O43.023 TWIN TO TWIN TRANSFUSION, THIRD TRIMESTER: ICD-10-CM

## 2017-03-17 DIAGNOSIS — O30.039 MONOCHORIONIC DIAMNIOTIC TWIN PREGNANCY: Primary | ICD-10-CM

## 2017-03-17 PROCEDURE — 25000125 ZZHC RX 250

## 2017-03-17 PROCEDURE — 76816 OB US FOLLOW-UP PER FETUS: CPT | Mod: 59

## 2017-03-17 PROCEDURE — 76819 FETAL BIOPHYS PROFIL W/O NST: CPT | Performed by: OBSTETRICS & GYNECOLOGY

## 2017-03-17 RX ORDER — BETAMETHASONE SODIUM PHOSPHATE AND BETAMETHASONE ACETATE 3; 3 MG/ML; MG/ML
12 INJECTION, SUSPENSION INTRA-ARTICULAR; INTRALESIONAL; INTRAMUSCULAR; SOFT TISSUE ONCE
Qty: 2 ML | Refills: 0 | Status: SHIPPED
Start: 2017-03-17 | End: 2021-12-09

## 2017-03-17 NOTE — MR AVS SNAPSHOT
After Visit Summary   3/17/2017    Julius Wilson    MRN: [id number]           Patient Information     Date Of Birth          1985        Visit Information        Provider Department      3/17/2017 3:30 PM Miller Perez MD [facility]etal Medicine - [place]        Today's Diagnoses     Monochorionic diamniotic twin pregnancy    -  1    Twin to twin transfusion, third trimester           Follow-ups after your visit        Your next 10 appointments already scheduled     Mar 20, 2017  3:00 PM CDT   MFM US COMPRE TWINS F/U with URMFMUSR3   [facility]etal [facility] Ultrasound - [facility])    [address]  [facility] [address] [phone]           Wear comfortable clothes and leave your valuables at home.            Mar 20, 2017  3:30 PM CDT   Radiology MD with ANDREW FARIAS MD   [facility] - [facility])    [address]  [facility] [address] [phone]           Please arrive at the time given for your first appointment.  This visit is used internally to schedule the physician's time during your ultrasound.            Mar 23, 2017  9:00 AM CDT   Ech Fetal Follow-Up* with URFETR1   [facility]    [address] [place] [facility] [address]               Mar 23, 2017 10:00 AM CDT   Ech Fetal -Twin B Complete* with URFETR1   [facility] Echo[facility] ([facility])    [address] [place] [address]  [facility] [place] [address]               Mar 23, 2017 10:15 AM CDT   MFM US COMPRE TWINS F/U with URMFMUSR4   [facility] Ultrasound - [place] ([facility])    [address]  [facility] [address] [phone]           Wear comfortable clothes and leave your valuables at home.            Mar 23, 2017 10:45 AM CDT   Radiology MD with ANDREW FARIAS  MD   [facility] Maternal Fetal Medicine - [place] ([facility])    [address]  [facility] [address]   [phone]           Please arrive at the time given for your first appointment.  This visit is used internally to schedule the physician's time during your ultrasound.            Mar 28, 2017  2:15 PM CDT   MFM US COMPRE TWINS F/U with URMFMUSR2   [facility] Maternal Fetal Medicine Ultrasound - [place] ([facility])    [address]  [facility] [address] [phone]           Wear comfortable clothes and leave your valuables at home.            Mar 28, 2017  3:00 PM CDT   Ob Follow Up with UR EXAM RM 1   [facility] Fetal Medicine - [place] ([facility])    [address]  [facility] [address]   [phone]            Mar 31, 2017  1:30 PM CDT   MFM US COMPRE TWINS F/U with URMFMUSR4   [facility] Maternal Fetal Medicine Ultrasound - [place] ([facility])    [address]  [facility] [address] [phone]           Wear comfortable clothes and leave your valuables at home.            Mar 31, 2017  2:00 PM CDT   Radiology MD with UR DINORA MAHONEY   [facility] Fetal Medicine - [place] ([facility])    [address]  [facility] [address]   [phone]           Please arrive at the time given for your first appointment.  This visit is used internally to schedule the physician's time during your ultrasound.              Who to contact     If you have questions or need follow up information about today's clinic visit or your schedule please contact [facility]ERNAL FETAL MEDICINE [facility] directly at [phone].  Normal or non-critical lab and imaging results will be communicated to you by MyChart, letter or phone within 4 business days after the clinic has received the results. If  "you do not hear from us within 7 days, please contact the clinic through Multispan or phone. If you have a critical or abnormal lab result, we will notify you by phone as soon as possible.  Submit refill requests through Multispan or call your pharmacy and they will forward the refill request to us. Please allow 3 business days for your refill to be completed.          Additional Information About Your Visit        [employer] ([employer]ozero/Gengib[employer]) Information     Multispan lets you send messages to your doctor, view your test results, renew your prescriptions, schedule appointments and more. To sign up, go to [email or web address] . Click on \"Log in\" on the left side of the screen, which will take you to the Welcome page. Then click on \"Sign up Now\" on the right side of the page.     You will be asked to enter the access code listed below, as well as some personal information. Please follow the directions to create your username and password.     Your access code is: [email or web address]  Expires: 2017  4:15 PM     Your access code will  in 90 days. If you need help or a new code, please call your [place] clinic or [phone].        Care EveryWhere ID     This is your Care EveryWhere ID. This could be used by other organizations to access your [place] medical records  [id number]        Your Vitals Were     Last Period                   2016            Blood Pressure from Last 3 Encounters:   03/15/17 108/71   17 106/75   17 102/66    Weight from Last 3 Encounters:   03/15/17 88 kg (194 lb 1.6 oz)   17 89.4 kg (197 lb 1.6 oz)              Today, you had the following     No orders found for display         Today's Medication Changes          These changes are accurate as of: 3/17/17  6:02 PM.  If you have any questions, ask your nurse or doctor.               Start taking these medicines.        Dose/Directions    betamethasone acet & sod phos 6 (3-3) MG/ML Susp injection   Commonly known as:  CELESTONE   Used " for:  Monochorionic diamniotic twin pregnancy, Twin to twin transfusion, third trimester        Dose:  12 mg   Inject 2 mLs (12 mg) into the muscle once for 1 dose   Quantity:  2 mL   Refills:  0            Where to get your medicines      These medications were sent to [facility]  [address] [facility] [address]     Phone:  [phone]     betamethasone acet & sod phos 6 (3-3) MG/ML Susp injection                Primary Care Provider    No Pcp Confirmed       No address on file        Thank you!     Thank you for choosing [facility] [place]  for your care. Our goal is always to provide you with excellent care. Hearing back from our patients is one way we can continue to improve our services. Please take a few minutes to complete the written survey that you may receive in the mail after your visit with us. Thank you!             Your Updated Medication List - Protect others around you: Learn how to safely use, store and throw away your medicines at www.disposemymeds.org.          This list is accurate as of: 3/17/17  6:02 PM.  Always use your most recent med list.                   Brand Name Dispense Instructions for use    betamethasone acet & sod phos 6 (3-3) MG/ML Susp injection    CELESTONE    2 mL    Inject 2 mLs (12 mg) into the muscle once for 1 dose       calcium carbonate 500 MG chewable tablet    TUMS     Take 1 chew tab by mouth daily       calcium carbonate 500 MG tablet    OS-TIARRA 500 mg [place]. Ca     Take 500 mg by mouth 2 times daily       folic acid 1 MG tablet    FOLVITE         IRON SUPPLEMENT PO      Take 325 mg by mouth 2 times daily (with meals)       prenatal multivitamin  plus iron 27-0.8 MG Tabs per tablet      Take 1 tablet by mouth daily       VITAMIN D (CHOLECALCIFEROL) PO      Take 2,000 Units by mouth daily

## 2017-03-17 NOTE — NURSING NOTE
Julius here today for u/s due to preg c/b mono/di twins with TTTS.  Dr. Perez in to talk with pt.  Recommended pt receive Betamethasone today, u/s, NST, and Betamethasone tomorrow on the [facility] and C/S on Sunday, 3/19.  Pt was given Betamethasone  12 mg #1 today at 4:45 pm, see med note.  Pt was given instructions to report to [facility] tomorrow at 7 am for u/s and 2nd Betamethasone injection.  C/S scheduled for 3/19 at 8:30 am.  Pt was given soap and instructions to do prior to surgery, but time of surgery wasn't selected yet, so pt will need time of surgery for Sunday. Pt tolerated injection and left amb and stable. Geraldine Connors RN

## 2017-03-18 ENCOUNTER — HOSPITAL ENCOUNTER (OUTPATIENT)
Dept: ULTRASOUND IMAGING | Facility: CLINIC | Age: 32
End: 2017-03-18
Payer: COMMERCIAL

## 2017-03-18 ENCOUNTER — HOSPITAL ENCOUNTER (OUTPATIENT)
Facility: CLINIC | Age: 32
End: 2017-03-18
Attending: OBSTETRICS & GYNECOLOGY | Admitting: OBSTETRICS & GYNECOLOGY
Payer: COMMERCIAL

## 2017-03-18 ENCOUNTER — HOSPITAL ENCOUNTER (OUTPATIENT)
Facility: CLINIC | Age: 32
Discharge: HOME | End: 2017-03-18
Attending: OBSTETRICS & GYNECOLOGY | Admitting: OBSTETRICS & GYNECOLOGY
Payer: COMMERCIAL

## 2017-03-18 VITALS — TEMPERATURE: 98.3 F | DIASTOLIC BLOOD PRESSURE: 60 MMHG | SYSTOLIC BLOOD PRESSURE: 98 MMHG | OXYGEN SATURATION: 97 %

## 2017-03-18 LAB
ABO + RH BLD: NORMAL
ABO + RH BLD: NORMAL
BLD GP AB SCN SERPL QL: NORMAL
BLOOD BANK CMNT PATIENT-IMP: NORMAL
ERYTHROCYTE [DISTWIDTH] IN BLOOD BY AUTOMATED COUNT: 12.5 % (ref 10–15)
HCT VFR BLD AUTO: 30.8 % (ref 35–47)
HGB BLD-MCNC: 10.3 G/DL (ref 11.7–15.7)
MCH RBC QN AUTO: 29.8 PG (ref 26.5–33)
MCHC RBC AUTO-ENTMCNC: 33.4 G/DL (ref 31.5–36.5)
MCV RBC AUTO: 89 FL (ref 78–100)
PLATELET # BLD AUTO: 212 10E9/L (ref 150–450)
RBC # BLD AUTO: 3.46 10E12/L (ref 3.8–5.2)
SPECIMEN EXP DATE BLD: NORMAL
WBC # BLD AUTO: 14.9 10E9/L (ref 4–11)

## 2017-03-18 PROCEDURE — 99214 OFFICE O/P EST MOD 30 MIN: CPT | Mod: 25

## 2017-03-18 PROCEDURE — 25000125 ZZHC RX 250: Performed by: OBSTETRICS & GYNECOLOGY

## 2017-03-18 PROCEDURE — 86901 BLOOD TYPING SEROLOGIC RH(D): CPT | Performed by: OBSTETRICS & GYNECOLOGY

## 2017-03-18 PROCEDURE — 86850 RBC ANTIBODY SCREEN: CPT | Performed by: OBSTETRICS & GYNECOLOGY

## 2017-03-18 PROCEDURE — 36415 COLL VENOUS BLD VENIPUNCTURE: CPT | Performed by: OBSTETRICS & GYNECOLOGY

## 2017-03-18 PROCEDURE — 96372 THER/PROPH/DIAG INJ SC/IM: CPT

## 2017-03-18 PROCEDURE — 85027 COMPLETE CBC AUTOMATED: CPT | Performed by: OBSTETRICS & GYNECOLOGY

## 2017-03-18 PROCEDURE — 59025 FETAL NON-STRESS TEST: CPT

## 2017-03-18 PROCEDURE — 76819 FETAL BIOPHYS PROFIL W/O NST: CPT

## 2017-03-18 PROCEDURE — 76820 UMBILICAL ARTERY ECHO: CPT | Performed by: OBSTETRICS & GYNECOLOGY

## 2017-03-18 PROCEDURE — 86900 BLOOD TYPING SEROLOGIC ABO: CPT | Performed by: OBSTETRICS & GYNECOLOGY

## 2017-03-18 PROCEDURE — 59025 FETAL NON-STRESS TEST: CPT | Mod: 59

## 2017-03-18 RX ORDER — BETAMETHASONE SODIUM PHOSPHATE AND BETAMETHASONE ACETATE 3; 3 MG/ML; MG/ML
12 INJECTION, SUSPENSION INTRA-ARTICULAR; INTRALESIONAL; INTRAMUSCULAR; SOFT TISSUE ONCE
Status: COMPLETED | OUTPATIENT
Start: 2017-03-18 | End: 2017-03-18

## 2017-03-18 RX ADMIN — BETAMETHASONE SODIUM PHOSPHATE AND BETAMETHASONE ACETATE 12 MG: 3; 3 INJECTION, SUSPENSION INTRA-ARTICULAR; INTRALESIONAL; INTRAMUSCULAR at 08:23

## 2017-03-18 NOTE — DISCHARGE INSTRUCTIONS
Discharge Instruction for Undelivered Patients      You were seen for: Fetal Assessment  We Consulted:   You had (Test or Medicine):fetal monitoring and Betamethasone #2    Diet:   Drink 8 to 12 glasses of liquids (milk, juice, water) every day.  You may eat meals and snacks.     Activity:  Call your doctor or nurse midwife if your baby is moving less than usual.     Call your provider if you notice:  Swelling in your face or increased swelling in your hands or legs.  Headaches that are not relieved by Tylenol (acetaminophen).  Changes in your vision (blurring: seeing spots or stars.)  Nausea (sick to your stomach) and vomiting (throwing up).   Weight gain of 5 pounds or more per week.  Heartburn that doesn't go away.  Signs of bladder infection: pain when you urinate (use the toilet), need to go more often and more urgently.  The bag of ribera (rupture of membranes) breaks, or you notice leaking in your underwear.  Bright red blood in your underwear.  Abdominal (lower belly) or stomach pain.  For first baby: Contractions (tightening) less than 5 minutes apart for one hour or more.  Second (plus) baby: Contractions (tightening) less than 10 minutes apart and getting stronger.  *If less than 34 weeks: Contractions (tightenings) more than 6 times in one hour.  Increase or change in vaginal discharge (note the color and amount)  Other:     Follow-up:    Follow pre-op instructions that were given to you at the clinic  Return to Labor and Delivery at 0630 AM tomorrow

## 2017-03-18 NOTE — PROGRESS NOTES
Data: [facility]  Reason for maternal/fetal assessment per patient is BPP,  fetal monitoring and Betamethasone #2 for mono/di twins. Patient is a . Prenatal record reviewed.      Obstetric History       T0      TAB0   SAB0   E0   M0   L0       # Outcome Date GA Lbr Esdras/2nd Weight Sex Delivery Anes PTL Lv   1 Current                  Medical History: History reviewed. No pertinent past medical history.. Gestational Age 31w1d. VSS. Cervix: not examined.  Fetal movement present. Patient denies cramping, backache, vaginal discharge, pelvic pressure, UTI symptoms, GI problems, bloody show, vaginal bleeding, edema, headache, visual disturbances, epigastric or URQ pain, abdominal pain, rupture of membranes. Support persons  present.  Action: Verbal consent for EFM. Triage assessment completed. EFM applied for NST, AGA both A&B fetus. Uterine assessment [place], not feeling contractions. Patient education given on discharge instructions Patient instructed to report change in fetal movement, vaginal leaking of fluid or bleeding, abdominal pain, or any concerns related to the pregnancy to her nurse/physician.   Response: Dr. Perez informed of admission and was at bedside prior to discharge. Plan per provider is C/S in AM. Patient verbalized understanding of education and verbalized agreement with plan. Discharged ambulatory at 1040.

## 2017-03-18 NOTE — IP AVS SNAPSHOT
MRN:[id number]                      After Visit Summary   3/18/2017    Julius Wilson    MRN: [id number]           Thank you!     Thank you for choosing [place] for your care. Our goal is always to provide you with excellent care. Hearing back from our patients is one way we can continue to improve our services. Please take a few minutes to complete the written survey that you may receive in the mail after you visit with us. Thank you!        Patient Information     Date Of Birth          1985        About your hospital stay     You were admitted on:  March 18, 2017 You last received care in the:  [facility] 4B[facility]    You were discharged on:  March 18, 2017       Who to Call     For medical emergencies, please call 911.  For non-urgent questions about your medical care, please call your primary care provider or clinic, None          Attending Provider     Provider Specialty    Miller Perez MD OB/Gyn       Primary Care Provider    No Pcp Confirmed       No address on file        Your next 10 appointments already scheduled     Mar 20, 2017  3:00 PM CDT   MFM US COMPRE TWINS F/U with URMFMUSR3   [facility] Maternal Fetal Medicine Ultrasound - [facility])    [address]  [facility] [address] [phone]           Wear comfortable clothes and leave your valuables at home.            Mar 20, 2017  3:30 PM CDT   Radiology MD with UR DINORA MAHONEY   [facility])    [address]  [facility] [address] [phone]           Please arrive at the time given for your first appointment.  This visit is used internally to schedule the physician's time during your ultrasound.            Mar 23, 2017  9:00 AM CDT   Ech Fetal Follow-Up* with URFETR1   [facility] Echo/EKG ([facility])    [address]  [facility] [address]               Mar 23,  2017 10:00 AM CDT   Ech Fetal -Twin B Complete* with URFETR1   [facility])    [address] [place] [address]  [facility] [place] [address]               Mar 23, 2017 10:15 AM CDT   MFM US COMPRE TWINS F/U with URMFMUSR4   [facility] - [place] ([facility])    [address]  [facility] [address]   [phone]           Wear comfortable clothes and leave your valuables at home.            Mar 23, 2017 10:45 AM CDT   Radiology MD with ANDREW FARIAS MD   [facility] Maternal Fetal Medicine - [place] ([facility])    [address]  [facility] [address]   [phone]           Please arrive at the time given for your first appointment.  This visit is used internally to schedule the physician's time during your ultrasound.            Mar 28, 2017  2:15 PM CDT   MFM US COMPRE TWINS F/U with URMFMUSR2   [facility] - [place] ([facility])    [address]  [facility] [address]   [phone]           Wear comfortable clothes and leave your valuables at home.            Mar 28, 2017  3:00 PM CDT   Ob Follow Up with UR EXAM RM 1   [facility] Maternal Fetal Medicine - [place] ([facility])    [address]  [facility] [address]   [phone]            Mar 31, 2017  1:30 PM CDT   MFM US COMPRE TWINS F/U with URMFMUSR4   [facility] - [place] ([facility])    [address]  [facility] [address]   [phone]           Wear comfortable clothes and leave your valuables at home.            Mar 31, 2017  2:00 PM CDT   Radiology MD with ANDREW FARIAS MD   [facility] - [place] ([facility])    [address]  [facility] [place] [address]    [phone]           Please arrive at the time given for your first appointment.  This visit is used internally to schedule the physician's time during your ultrasound.              Further instructions from your care team       Discharge Instruction for Undelivered Patients      You were seen for: Fetal Assessment  We Consulted:   You had (Test or Medicine):fetal monitoring and Betamethasone #2    Diet:   Drink 8 to 12 glasses of liquids (milk, juice, water) every day.  You may eat meals and snacks.     Activity:  Call your doctor or nurse midwife if your baby is moving less than usual.     Call your provider if you notice:  Swelling in your face or increased swelling in your hands or legs.  Headaches that are not relieved by Tylenol (acetaminophen).  Changes in your vision (blurring: seeing spots or stars.)  Nausea (sick to your stomach) and vomiting (throwing up).   Weight gain of 5 pounds or more per week.  Heartburn that doesn't go away.  Signs of bladder infection: pain when you urinate (use the toilet), need to go more often and more urgently.  The bag of ribera (rupture of membranes) breaks, or you notice leaking in your underwear.  Bright red blood in your underwear.  Abdominal (lower belly) or stomach pain.  For first baby: Contractions (tightening) less than 5 minutes apart for one hour or more.  Second (plus) baby: Contractions (tightening) less than 10 minutes apart and getting stronger.  *If less than 34 weeks: Contractions (tightenings) more than 6 times in one hour.  Increase or change in vaginal discharge (note the color and amount)  Other:     Follow-up:    Follow pre-op instructions that were given to you at the clinic  Return to Labor and Delivery at 0630 AM tomorrow          Pending Results     Date and Time Order Name Status Description    3/18/2017 0942 ABO/Rh type and screen In process     3/18/2017 0719 Maternal Fetal BPP Twins In process             Admission Information     Date & Time  "Provider Department Dept. Phone    3/18/2017 Miller Perez MD [facility] 4BOB [phone]      Your Vitals Were     Last Period                   2016           Vibby Information     [employer]ibby lets you send messages to your doctor, view your test results, renew your prescriptions, schedule appointments and more. To sign up, go to [email or web address] . Click on \"Log in\" on the left side of the screen, which will take you to the Welcome page. Then click on \"Sign up Now\" on the right side of the page.     You will be asked to enter the access code listed below, as well as some personal information. Please follow the directions to create your username and password.     Your access code is: [email or web address]  Expires: 2017  4:15 PM     Your access code will  in 90 days. If you need help or a new code, please call your [place] clinic or [phone].        Care EveryWhere ID     This is your Care EveryWhere ID. This could be used by other organizations to access your [place] medical records  [id number]           Review of your medicines      UNREVIEWED medicines. Ask your doctor about these medicines        Dose / Directions    betamethasone acet & sod phos 6 (3-3) MG/ML Susp injection   Commonly known as:  CELESTONE   Used for:  Monochorionic diamniotic twin pregnancy, Twin to twin transfusion, third trimester        Dose:  12 mg   Inject 2 mLs (12 mg) into the muscle once for 1 dose   Quantity:  2 mL   Refills:  0       calcium carbonate 500 MG chewable tablet   Commonly known as:  TUMS        Dose:  1 chew tab   Take 1 chew tab by mouth daily   Refills:  0       calcium carbonate 500 MG tablet   Commonly known as:  OS-TIARRA 500 mg [place]. Ca        Dose:  500 mg   Take 500 mg by mouth 2 times daily   Refills:  0       folic acid 1 MG tablet   Commonly known as:  FOLVITE        Refills:  0       IRON SUPPLEMENT PO   Indication:  Anemia From Inadequate Iron in the Body        Dose:  325 mg   Take 325 mg by " mouth 2 times daily (with meals)   Refills:  0       prenatal multivitamin  plus iron 27-0.8 MG Tabs per tablet        Dose:  1 tablet   Take 1 tablet by mouth daily   Refills:  0       VITAMIN D (CHOLECALCIFEROL) PO        Dose:  2000 Units   Take 2,000 Units by mouth daily   Refills:  0                Protect others around you: Learn how to safely use, store and throw away your medicines at www.disposemymeds.org.             Medication List: This is a list of all your medications and when to take them. Check marks below indicate your daily home schedule. Keep this list as a reference.      Medications           Morning Afternoon Evening Bedtime As Needed    betamethasone acet & sod phos 6 (3-3) MG/ML Susp injection   Commonly known as:  CELESTONE   Inject 2 mLs (12 mg) into the muscle once for 1 dose   Last time this was given:  12 mg on 3/18/2017  8:23 AM                                calcium carbonate 500 MG chewable tablet   Commonly known as:  TUMS   Take 1 chew tab by mouth daily                                calcium carbonate 500 MG tablet   Commonly known as:  OS-TIARRA 500 mg [place]. Ca   Take 500 mg by mouth 2 times daily                                folic acid 1 MG tablet   Commonly known as:  FOLVITE                                IRON SUPPLEMENT PO   Take 325 mg by mouth 2 times daily (with meals)                                prenatal multivitamin  plus iron 27-0.8 MG Tabs per tablet   Take 1 tablet by mouth daily                                VITAMIN D (CHOLECALCIFEROL) PO   Take 2,000 Units by mouth daily

## 2017-03-18 NOTE — CONSULTS
Neonatology Antepartum Counseling     I was asked to provide a tour of the NICU for Gallito, partner of Julius Wilson. Julius is currently 31 1/7 weeks gestation. Pregnancy complicated by mono/di twins with twin to twin transfusion and poor growth.   Gallito was shown all types of NICU nurseries. We reviewed possible support his infants might require including mechanical ventilation, phototherapy, and thermoregulation needs with an isolette and warmer. The importance of breastmilk and how/when it would be given to his infants were discussed. The importance of infection prevention was described. Lastly, the range of staff available in the NICU for both parents and their boys were explained.    Thank you for the opportunity to participate in the care of Julius and her family. Please feel free to contact the NICU team if we can provide any further support.        YOLANDA Castillo, NNP-BC     3/18/2017, 10:26 AM

## 2017-03-18 NOTE — PROGRESS NOTES
S: Pt without complaints.  Denies LOF, VB or contractions.  Babies active.  Ready for delivery tomorrow.    O: LMP 08/12/2016  98.3 F, 98/60 BP P 91    Gen: Pt AAOx3, NAD  Abd: Gravid, S>D, soft, NT   EFM: 130's twin A, 130's twin B, reactive x 2 and reassuring.  Yaak: quiet    US:    Indication  ========    Monochorionic, Diamniotic Twin gestation; S/p Amnio reduction for TTTS.    Method  ======    [facility] ANTEPARTUM inpatient exam, Transabdominal ultrasound examination.    Pregnancy  =========    Twin pregnancy. Monochorionic-diamniotic. Number of fetuses: 2.    Dating  ======    LMP on: 8/12/2016  GA by LMP 31 w + 1 d  ERIN by LMP: 5/19/2017  Assigned: Dating performed on 03/18/2017, based on the LMP  Assigned GA 31 w + 1 d  Assigned ERIN: 5/19/2017    Fetus 1  ======    Cardiac activity: present.  bpm.  Fetal movements: visualized.  Presentation: cephalic, maternal left anterior, presenting.  Placenta:  Placental site: posterior fundal, thin dividing membrane.  Umbilical cord: previously studied.    Fetus 2  ======    Cardiac activity: present.  bpm.  Fetal movements: visualized.  Presentation: breech, maternal right posterior, superior.  Placenta:  Placental site: posterior fundal, thin dividing membrane.  Umbilical cord: previously studied.    Fetus 1  ======    Amount of AF: normal  MVP 4.8 cm    Fetus 2  ======    Amount of AF: normal  MVP 4.0 cm    Fetus 1  ======    2: Fetal breathing movements  2: Gross body movements  2: Fetal tone  2: Amniotic fluid volume  8/8: Biophysical profile score    Fetus 2  ======    2: Fetal breathing movements  2: Gross body movements  2: Fetal tone  2: Amniotic fluid volume  8/8: Biophysical profile score    Fetus 1  ======    Umbilical Cord  Umbilical artery: normal  Umbilical A S / D 2.54 35% Jenn  Umbilical A  bpm  Head / Brain  Rt MCA PI divided by: Umbilical artery PI  Lt MCA PI divided by: Umbilical artery PI    Fetus 2  ======    Umbilical  Cord  Umbilical artery: normal  Umbilical A S / D 2.87 57% Jenn  Umbilical A  bpm  Head / Brain  Rt MCA PI divided by: Umbilical artery PI  Lt MCA PI divided by: Umbilical artery PI    Maternal Structures  ===============    Uterus / Cervix  Cervix: Not examined    Impression  =========    1) Monochorionic diamniotic twin pregnancy at 31 1/7 weeks gestational age.  2) The amniotic fluid volume appeared normal around both twins.  3) The BPP is reassuring in both twins.  4) The umbilical artery doppler studies were within normal limits for both twins.      A/P: Julius Wilson is a  at 31w1d who presents today for repeat BPP and BMZ administration.  Pregnancy complicated by TTTS at 29 weeks gestation, now with no interval growth of twin A (former recipient twin).  Therefore, delivery is recommended after BMZ administration.    Julius received her second dose of BMZ today and fetal status continues to be reassuring. Will plan to proceed with delivery as planned tomorrow. Will plan MgSO4 pre-delivery and plan delivery by repeat CS due to malpresentation of twin B. Patient and  in agreement with this plan.  Plan d/c home today and pre-op CS instructions reviewed with patient.      Miller Perez    Time Spent on this Encounter   I, Miller Perez, spent a total of 30 minutes bedside and on the inpatient unit today managing the care of Julius Wilson.  Over 50% of my time on the unit was spent counseling the patient and /or coordinating care regarding pregnancy complicated by mo-di twin and TTTS. See note for details.    Miller Perez

## 2017-03-18 NOTE — IP AVS SNAPSHOT
[facility]    [address] [place] [address]    Phone:  [phone]                                       After Visit Summary   3/18/2017    Julius Wilson    MRN: [id number]           After Visit Summary Signature Page     I have received my discharge instructions, and my questions have been answered. I have discussed any challenges I see with this plan with the nurse or doctor.    ..........................................................................................................................................  Patient/Patient Representative Signature      ..........................................................................................................................................  Patient Representative Print Name and Relationship to Patient    ..................................................               ................................................  Date                                            Time    ..........................................................................................................................................  Reviewed by Signature/Title    ...................................................              ..............................................  Date                                                            Time

## 2017-03-19 ENCOUNTER — ANESTHESIA (OUTPATIENT)
Dept: OBGYN | Facility: CLINIC | Age: 32
End: 2017-03-19
Payer: COMMERCIAL

## 2017-03-19 ENCOUNTER — HOSPITAL ENCOUNTER (INPATIENT)
Facility: CLINIC | Age: 32
LOS: 3 days | Discharge: HOME | End: 2017-03-22
Attending: OBSTETRICS & GYNECOLOGY | Admitting: OBSTETRICS & GYNECOLOGY
Payer: COMMERCIAL

## 2017-03-19 ENCOUNTER — ANESTHESIA EVENT (OUTPATIENT)
Dept: OBGYN | Facility: CLINIC | Age: 32
End: 2017-03-19
Payer: COMMERCIAL

## 2017-03-19 DIAGNOSIS — Z98.891 S/P CESAREAN SECTION: Primary | ICD-10-CM

## 2017-03-19 DIAGNOSIS — D62 ANEMIA DUE TO BLOOD LOSS, ACUTE: ICD-10-CM

## 2017-03-19 LAB — T PALLIDUM IGG+IGM SER QL: NEGATIVE

## 2017-03-19 PROCEDURE — 37000009 ZZH ANESTHESIA TECHNICAL FEE, EACH ADDTL 15 MIN: Performed by: OBSTETRICS & GYNECOLOGY

## 2017-03-19 PROCEDURE — 25000128 H RX IP 250 OP 636: Performed by: ANESTHESIOLOGY

## 2017-03-19 PROCEDURE — 25000128 H RX IP 250 OP 636: Performed by: NURSE ANESTHETIST, CERTIFIED REGISTERED

## 2017-03-19 PROCEDURE — 36000057 ZZH SURGERY LEVEL 3 1ST 30 MIN - UMMC: Performed by: OBSTETRICS & GYNECOLOGY

## 2017-03-19 PROCEDURE — 88307 TISSUE EXAM BY PATHOLOGIST: CPT | Mod: 26 | Performed by: OBSTETRICS & GYNECOLOGY

## 2017-03-19 PROCEDURE — 40000010 ZZH STATISTIC ANES STAT CODE-CRNA PER MINUTE: Performed by: OBSTETRICS & GYNECOLOGY

## 2017-03-19 PROCEDURE — 25000125 ZZHC RX 250: Performed by: OBSTETRICS & GYNECOLOGY

## 2017-03-19 PROCEDURE — 71000014 ZZH RECOVERY PHASE 1 LEVEL 2 FIRST HR: Performed by: OBSTETRICS & GYNECOLOGY

## 2017-03-19 PROCEDURE — 88307 TISSUE EXAM BY PATHOLOGIST: CPT | Performed by: OBSTETRICS & GYNECOLOGY

## 2017-03-19 PROCEDURE — 27210794 ZZH OR GENERAL SUPPLY STERILE: Performed by: OBSTETRICS & GYNECOLOGY

## 2017-03-19 PROCEDURE — 25000125 ZZHC RX 250: Performed by: NURSE ANESTHETIST, CERTIFIED REGISTERED

## 2017-03-19 PROCEDURE — 25000132 ZZH RX MED GY IP 250 OP 250 PS 637

## 2017-03-19 PROCEDURE — 25000132 ZZH RX MED GY IP 250 OP 250 PS 637: Performed by: OBSTETRICS & GYNECOLOGY

## 2017-03-19 PROCEDURE — 86780 TREPONEMA PALLIDUM: CPT | Performed by: OBSTETRICS & GYNECOLOGY

## 2017-03-19 PROCEDURE — 25000128 H RX IP 250 OP 636: Performed by: OBSTETRICS & GYNECOLOGY

## 2017-03-19 PROCEDURE — 25800025 ZZH RX 258: Performed by: NURSE ANESTHETIST, CERTIFIED REGISTERED

## 2017-03-19 PROCEDURE — C9290 INJ, BUPIVACAINE LIPOSOME: HCPCS | Performed by: ANESTHESIOLOGY

## 2017-03-19 PROCEDURE — 25000125 ZZHC RX 250: Performed by: ANESTHESIOLOGY

## 2017-03-19 PROCEDURE — 12000030 ZZH R&B OB INTERMEDIATE UMMC

## 2017-03-19 PROCEDURE — C1765 ADHESION BARRIER: HCPCS | Performed by: OBSTETRICS & GYNECOLOGY

## 2017-03-19 PROCEDURE — 71000015 ZZH RECOVERY PHASE 1 LEVEL 2 EA ADDTL HR: Performed by: OBSTETRICS & GYNECOLOGY

## 2017-03-19 PROCEDURE — 40000169 ZZH STATISTIC PRE-PROCEDURE ASSESSMENT I: Performed by: OBSTETRICS & GYNECOLOGY

## 2017-03-19 PROCEDURE — 36415 COLL VENOUS BLD VENIPUNCTURE: CPT | Performed by: OBSTETRICS & GYNECOLOGY

## 2017-03-19 PROCEDURE — 37000008 ZZH ANESTHESIA TECHNICAL FEE, 1ST 30 MIN: Performed by: OBSTETRICS & GYNECOLOGY

## 2017-03-19 PROCEDURE — 36000059 ZZH SURGERY LEVEL 3 EA 15 ADDTL MIN UMMC: Performed by: OBSTETRICS & GYNECOLOGY

## 2017-03-19 PROCEDURE — 25800025 ZZH RX 258: Performed by: OBSTETRICS & GYNECOLOGY

## 2017-03-19 PROCEDURE — 27110028 ZZH OR GENERAL SUPPLY NON-STERILE: Performed by: OBSTETRICS & GYNECOLOGY

## 2017-03-19 RX ORDER — FENTANYL CITRATE 50 UG/ML
25-50 INJECTION, SOLUTION INTRAMUSCULAR; INTRAVENOUS
Status: DISCONTINUED | OUTPATIENT
Start: 2017-03-19 | End: 2017-03-19 | Stop reason: HOSPADM

## 2017-03-19 RX ORDER — OXYTOCIN/0.9 % SODIUM CHLORIDE 30/500 ML
100 PLASTIC BAG, INJECTION (ML) INTRAVENOUS CONTINUOUS
Status: DISCONTINUED | OUTPATIENT
Start: 2017-03-19 | End: 2017-03-22 | Stop reason: HOSPADM

## 2017-03-19 RX ORDER — OXYCODONE HYDROCHLORIDE 5 MG/1
5-10 TABLET ORAL
Status: DISCONTINUED | OUTPATIENT
Start: 2017-03-19 | End: 2017-03-22 | Stop reason: HOSPADM

## 2017-03-19 RX ORDER — MISOPROSTOL 200 UG/1
400 TABLET ORAL
Status: DISCONTINUED | OUTPATIENT
Start: 2017-03-19 | End: 2017-03-22 | Stop reason: HOSPADM

## 2017-03-19 RX ORDER — LIDOCAINE 40 MG/G
CREAM TOPICAL
Status: DISCONTINUED | OUTPATIENT
Start: 2017-03-19 | End: 2017-03-19

## 2017-03-19 RX ORDER — SODIUM CHLORIDE, SODIUM LACTATE, POTASSIUM CHLORIDE, CALCIUM CHLORIDE 600; 310; 30; 20 MG/100ML; MG/100ML; MG/100ML; MG/100ML
INJECTION, SOLUTION INTRAVENOUS CONTINUOUS
Status: DISCONTINUED | OUTPATIENT
Start: 2017-03-19 | End: 2017-03-19

## 2017-03-19 RX ORDER — BISACODYL 10 MG
10 SUPPOSITORY, RECTAL RECTAL DAILY PRN
Status: DISCONTINUED | OUTPATIENT
Start: 2017-03-21 | End: 2017-03-22 | Stop reason: HOSPADM

## 2017-03-19 RX ORDER — NALOXONE HYDROCHLORIDE 0.4 MG/ML
.1-.4 INJECTION, SOLUTION INTRAMUSCULAR; INTRAVENOUS; SUBCUTANEOUS
Status: DISCONTINUED | OUTPATIENT
Start: 2017-03-19 | End: 2017-03-22 | Stop reason: HOSPADM

## 2017-03-19 RX ORDER — ACETAMINOPHEN 325 MG/1
650 TABLET ORAL EVERY 4 HOURS PRN
Status: DISCONTINUED | OUTPATIENT
Start: 2017-03-22 | End: 2017-03-22 | Stop reason: HOSPADM

## 2017-03-19 RX ORDER — KETOROLAC TROMETHAMINE 30 MG/ML
30 INJECTION, SOLUTION INTRAMUSCULAR; INTRAVENOUS EVERY 6 HOURS
Status: COMPLETED | OUTPATIENT
Start: 2017-03-19 | End: 2017-03-20

## 2017-03-19 RX ORDER — SIMETHICONE 80 MG
80 TABLET,CHEWABLE ORAL 4 TIMES DAILY PRN
Status: DISCONTINUED | OUTPATIENT
Start: 2017-03-19 | End: 2017-03-22 | Stop reason: HOSPADM

## 2017-03-19 RX ORDER — CARBOPROST TROMETHAMINE 250 UG/ML
250 INJECTION, SOLUTION INTRAMUSCULAR
Status: DISCONTINUED | OUTPATIENT
Start: 2017-03-19 | End: 2017-03-22 | Stop reason: HOSPADM

## 2017-03-19 RX ORDER — DEXTROSE, SODIUM CHLORIDE, SODIUM LACTATE, POTASSIUM CHLORIDE, AND CALCIUM CHLORIDE 5; .6; .31; .03; .02 G/100ML; G/100ML; G/100ML; G/100ML; G/100ML
INJECTION, SOLUTION INTRAVENOUS CONTINUOUS
Status: DISCONTINUED | OUTPATIENT
Start: 2017-03-19 | End: 2017-03-22 | Stop reason: HOSPADM

## 2017-03-19 RX ORDER — SODIUM CHLORIDE, SODIUM LACTATE, POTASSIUM CHLORIDE, CALCIUM CHLORIDE 600; 310; 30; 20 MG/100ML; MG/100ML; MG/100ML; MG/100ML
INJECTION, SOLUTION INTRAVENOUS CONTINUOUS PRN
Status: DISCONTINUED | OUTPATIENT
Start: 2017-03-19 | End: 2017-03-19

## 2017-03-19 RX ORDER — BUPIVACAINE HYDROCHLORIDE 7.5 MG/ML
INJECTION, SOLUTION INTRASPINAL PRN
Status: DISCONTINUED | OUTPATIENT
Start: 2017-03-19 | End: 2017-03-19

## 2017-03-19 RX ORDER — CALCIUM GLUCONATE 94 MG/ML
1 INJECTION, SOLUTION INTRAVENOUS
Status: DISCONTINUED | OUTPATIENT
Start: 2017-03-19 | End: 2017-03-19

## 2017-03-19 RX ORDER — ONDANSETRON 4 MG/1
4 TABLET, ORALLY DISINTEGRATING ORAL EVERY 30 MIN PRN
Status: DISCONTINUED | OUTPATIENT
Start: 2017-03-19 | End: 2017-03-19 | Stop reason: HOSPADM

## 2017-03-19 RX ORDER — NALBUPHINE HYDROCHLORIDE 10 MG/ML
2.5-5 INJECTION, SOLUTION INTRAMUSCULAR; INTRAVENOUS; SUBCUTANEOUS EVERY 6 HOURS PRN
Status: DISCONTINUED | OUTPATIENT
Start: 2017-03-19 | End: 2017-03-19

## 2017-03-19 RX ORDER — METHYLERGONOVINE MALEATE 0.2 MG/ML
200 INJECTION INTRAVENOUS
Status: DISCONTINUED | OUTPATIENT
Start: 2017-03-19 | End: 2017-03-22 | Stop reason: HOSPADM

## 2017-03-19 RX ORDER — EPHEDRINE SULFATE 50 MG/ML
INJECTION, SOLUTION INTRAMUSCULAR; INTRAVENOUS; SUBCUTANEOUS PRN
Status: DISCONTINUED | OUTPATIENT
Start: 2017-03-19 | End: 2017-03-19

## 2017-03-19 RX ORDER — ONDANSETRON 2 MG/ML
4 INJECTION INTRAMUSCULAR; INTRAVENOUS EVERY 30 MIN PRN
Status: DISCONTINUED | OUTPATIENT
Start: 2017-03-19 | End: 2017-03-19 | Stop reason: HOSPADM

## 2017-03-19 RX ORDER — OXYTOCIN/0.9 % SODIUM CHLORIDE 30/500 ML
340 PLASTIC BAG, INJECTION (ML) INTRAVENOUS CONTINUOUS PRN
Status: DISCONTINUED | OUTPATIENT
Start: 2017-03-19 | End: 2017-03-22 | Stop reason: HOSPADM

## 2017-03-19 RX ORDER — IBUPROFEN 400 MG/1
400-800 TABLET, FILM COATED ORAL EVERY 6 HOURS PRN
Status: DISCONTINUED | OUTPATIENT
Start: 2017-03-19 | End: 2017-03-22 | Stop reason: HOSPADM

## 2017-03-19 RX ORDER — EPHEDRINE SULFATE 50 MG/ML
5 INJECTION, SOLUTION INTRAMUSCULAR; INTRAVENOUS; SUBCUTANEOUS
Status: DISCONTINUED | OUTPATIENT
Start: 2017-03-19 | End: 2017-03-19

## 2017-03-19 RX ORDER — SODIUM CHLORIDE, SODIUM LACTATE, POTASSIUM CHLORIDE, CALCIUM CHLORIDE 600; 310; 30; 20 MG/100ML; MG/100ML; MG/100ML; MG/100ML
INJECTION, SOLUTION INTRAVENOUS
Status: DISCONTINUED
Start: 2017-03-19 | End: 2017-03-19 | Stop reason: HOSPADM

## 2017-03-19 RX ORDER — HYDROCORTISONE 2.5 %
CREAM (GRAM) TOPICAL 3 TIMES DAILY PRN
Status: DISCONTINUED | OUTPATIENT
Start: 2017-03-19 | End: 2017-03-22 | Stop reason: HOSPADM

## 2017-03-19 RX ORDER — OXYTOCIN 10 [USP'U]/ML
10 INJECTION, SOLUTION INTRAMUSCULAR; INTRAVENOUS
Status: DISCONTINUED | OUTPATIENT
Start: 2017-03-19 | End: 2017-03-22 | Stop reason: HOSPADM

## 2017-03-19 RX ORDER — AMOXICILLIN 250 MG
1-2 CAPSULE ORAL 2 TIMES DAILY
Status: DISCONTINUED | OUTPATIENT
Start: 2017-03-19 | End: 2017-03-22 | Stop reason: HOSPADM

## 2017-03-19 RX ORDER — CEFAZOLIN SODIUM 2 G/100ML
2 INJECTION, SOLUTION INTRAVENOUS
Status: COMPLETED | OUTPATIENT
Start: 2017-03-19 | End: 2017-03-19

## 2017-03-19 RX ORDER — DIPHENHYDRAMINE HCL 25 MG
25 CAPSULE ORAL EVERY 6 HOURS PRN
Status: DISCONTINUED | OUTPATIENT
Start: 2017-03-19 | End: 2017-03-22 | Stop reason: HOSPADM

## 2017-03-19 RX ORDER — ONDANSETRON 2 MG/ML
4 INJECTION INTRAMUSCULAR; INTRAVENOUS EVERY 6 HOURS PRN
Status: DISCONTINUED | OUTPATIENT
Start: 2017-03-19 | End: 2017-03-22 | Stop reason: HOSPADM

## 2017-03-19 RX ORDER — FLUMAZENIL 0.1 MG/ML
0.2 INJECTION, SOLUTION INTRAVENOUS
Status: DISCONTINUED | OUTPATIENT
Start: 2017-03-19 | End: 2017-03-19 | Stop reason: HOSPADM

## 2017-03-19 RX ORDER — ACETAMINOPHEN 325 MG/1
975 TABLET ORAL EVERY 8 HOURS
Status: DISPENSED | OUTPATIENT
Start: 2017-03-19 | End: 2017-03-22

## 2017-03-19 RX ORDER — NALOXONE HYDROCHLORIDE 0.4 MG/ML
.1-.4 INJECTION, SOLUTION INTRAMUSCULAR; INTRAVENOUS; SUBCUTANEOUS
Status: DISCONTINUED | OUTPATIENT
Start: 2017-03-19 | End: 2017-03-19

## 2017-03-19 RX ORDER — ONDANSETRON 2 MG/ML
INJECTION INTRAMUSCULAR; INTRAVENOUS PRN
Status: DISCONTINUED | OUTPATIENT
Start: 2017-03-19 | End: 2017-03-19

## 2017-03-19 RX ORDER — DIPHENHYDRAMINE HYDROCHLORIDE 50 MG/ML
25 INJECTION INTRAMUSCULAR; INTRAVENOUS EVERY 6 HOURS PRN
Status: DISCONTINUED | OUTPATIENT
Start: 2017-03-19 | End: 2017-03-22 | Stop reason: HOSPADM

## 2017-03-19 RX ORDER — LIDOCAINE 40 MG/G
CREAM TOPICAL
Status: DISCONTINUED | OUTPATIENT
Start: 2017-03-19 | End: 2017-03-22 | Stop reason: HOSPADM

## 2017-03-19 RX ORDER — OXYTOCIN/0.9 % SODIUM CHLORIDE 30/500 ML
PLASTIC BAG, INJECTION (ML) INTRAVENOUS CONTINUOUS PRN
Status: DISCONTINUED | OUTPATIENT
Start: 2017-03-19 | End: 2017-03-19

## 2017-03-19 RX ORDER — MORPHINE SULFATE 1 MG/ML
INJECTION, SOLUTION EPIDURAL; INTRATHECAL; INTRAVENOUS PRN
Status: DISCONTINUED | OUTPATIENT
Start: 2017-03-19 | End: 2017-03-19

## 2017-03-19 RX ORDER — SODIUM CHLORIDE, SODIUM LACTATE, POTASSIUM CHLORIDE, CALCIUM CHLORIDE 600; 310; 30; 20 MG/100ML; MG/100ML; MG/100ML; MG/100ML
INJECTION, SOLUTION INTRAVENOUS CONTINUOUS
Status: DISCONTINUED | OUTPATIENT
Start: 2017-03-19 | End: 2017-03-19 | Stop reason: HOSPADM

## 2017-03-19 RX ORDER — NALOXONE HYDROCHLORIDE 0.4 MG/ML
.1-.4 INJECTION, SOLUTION INTRAMUSCULAR; INTRAVENOUS; SUBCUTANEOUS
Status: DISCONTINUED | OUTPATIENT
Start: 2017-03-19 | End: 2017-03-19 | Stop reason: HOSPADM

## 2017-03-19 RX ORDER — BUPIVACAINE HYDROCHLORIDE AND EPINEPHRINE 2.5; 5 MG/ML; UG/ML
INJECTION, SOLUTION INFILTRATION; PERINEURAL PRN
Status: DISCONTINUED | OUTPATIENT
Start: 2017-03-19 | End: 2017-03-19

## 2017-03-19 RX ORDER — LANOLIN 100 %
OINTMENT (GRAM) TOPICAL
Status: DISCONTINUED | OUTPATIENT
Start: 2017-03-19 | End: 2017-03-22 | Stop reason: HOSPADM

## 2017-03-19 RX ORDER — CEFAZOLIN SODIUM 1 G/3ML
1 INJECTION, POWDER, FOR SOLUTION INTRAMUSCULAR; INTRAVENOUS
Status: DISCONTINUED | OUTPATIENT
Start: 2017-03-19 | End: 2017-03-19

## 2017-03-19 RX ORDER — HYDROMORPHONE HYDROCHLORIDE 1 MG/ML
.3-.5 INJECTION, SOLUTION INTRAMUSCULAR; INTRAVENOUS; SUBCUTANEOUS EVERY 5 MIN PRN
Status: DISCONTINUED | OUTPATIENT
Start: 2017-03-19 | End: 2017-03-19 | Stop reason: HOSPADM

## 2017-03-19 RX ADMIN — MORPHINE SULFATE 0.2 MG: 1 INJECTION EPIDURAL; INTRATHECAL; INTRAVENOUS at 09:02

## 2017-03-19 RX ADMIN — BUPIVACAINE HYDROCHLORIDE AND EPINEPHRINE BITARTRATE 20 ML: 2.5; .005 INJECTION, SOLUTION INFILTRATION; PERINEURAL at 10:20

## 2017-03-19 RX ADMIN — OXYTOCIN-SODIUM CHLORIDE 0.9% IV SOLN 30 UNIT/500ML 100 ML/HR: 30-0.9/5 SOLUTION at 14:40

## 2017-03-19 RX ADMIN — SODIUM CITRATE AND CITRIC ACID MONOHYDRATE 30 ML: 500; 334 SOLUTION ORAL at 08:18

## 2017-03-19 RX ADMIN — BUPIVACAINE HYDROCHLORIDE IN DEXTROSE 1.8 ML: 7.5 INJECTION, SOLUTION SUBARACHNOID at 09:02

## 2017-03-19 RX ADMIN — SIMETHICONE CHEW TAB 80 MG 80 MG: 80 TABLET ORAL at 22:35

## 2017-03-19 RX ADMIN — SODIUM CHLORIDE, POTASSIUM CHLORIDE, SODIUM LACTATE AND CALCIUM CHLORIDE: 600; 310; 30; 20 INJECTION, SOLUTION INTRAVENOUS at 08:15

## 2017-03-19 RX ADMIN — Medication 6 G: at 08:03

## 2017-03-19 RX ADMIN — PHENYLEPHRINE HYDROCHLORIDE 0.2 MCG/KG/MIN: 10 INJECTION, SOLUTION INTRAMUSCULAR; INTRAVENOUS; SUBCUTANEOUS at 08:56

## 2017-03-19 RX ADMIN — Medication 5 MG: at 09:46

## 2017-03-19 RX ADMIN — Medication 10 MG: at 09:04

## 2017-03-19 RX ADMIN — SODIUM CHLORIDE, POTASSIUM CHLORIDE, SODIUM LACTATE AND CALCIUM CHLORIDE 1000 ML: 600; 310; 30; 20 INJECTION, SOLUTION INTRAVENOUS at 07:51

## 2017-03-19 RX ADMIN — SODIUM CHLORIDE, POTASSIUM CHLORIDE, SODIUM LACTATE AND CALCIUM CHLORIDE: 600; 310; 30; 20 INJECTION, SOLUTION INTRAVENOUS at 08:47

## 2017-03-19 RX ADMIN — PHENYLEPHRINE HYDROCHLORIDE 100 MCG: 10 INJECTION, SOLUTION INTRAMUSCULAR; INTRAVENOUS; SUBCUTANEOUS at 09:09

## 2017-03-19 RX ADMIN — KETOROLAC TROMETHAMINE 30 MG: 30 INJECTION, SOLUTION INTRAMUSCULAR at 15:54

## 2017-03-19 RX ADMIN — Medication 5 MG: at 09:07

## 2017-03-19 RX ADMIN — MISOPROSTOL: 200 TABLET ORAL at 09:55

## 2017-03-19 RX ADMIN — CEFAZOLIN SODIUM 2 G: 2 INJECTION, SOLUTION INTRAVENOUS at 09:09

## 2017-03-19 RX ADMIN — KETOROLAC TROMETHAMINE 30 MG: 30 INJECTION, SOLUTION INTRAMUSCULAR at 22:29

## 2017-03-19 RX ADMIN — PHENYLEPHRINE HYDROCHLORIDE 100 MCG: 10 INJECTION, SOLUTION INTRAMUSCULAR; INTRAVENOUS; SUBCUTANEOUS at 09:05

## 2017-03-19 RX ADMIN — PHENYLEPHRINE HYDROCHLORIDE 100 MCG: 10 INJECTION, SOLUTION INTRAMUSCULAR; INTRAVENOUS; SUBCUTANEOUS at 09:43

## 2017-03-19 RX ADMIN — ONDANSETRON 4 MG: 2 INJECTION INTRAMUSCULAR; INTRAVENOUS at 09:10

## 2017-03-19 RX ADMIN — OXYTOCIN-SODIUM CHLORIDE 0.9% IV SOLN 30 UNIT/500ML 18 UNITS/HR: 30-0.9/5 SOLUTION at 09:25

## 2017-03-19 RX ADMIN — PHENYLEPHRINE HYDROCHLORIDE 100 MCG: 10 INJECTION, SOLUTION INTRAMUSCULAR; INTRAVENOUS; SUBCUTANEOUS at 09:17

## 2017-03-19 RX ADMIN — PHENYLEPHRINE HYDROCHLORIDE 100 MCG: 10 INJECTION, SOLUTION INTRAMUSCULAR; INTRAVENOUS; SUBCUTANEOUS at 09:22

## 2017-03-19 RX ADMIN — OXYTOCIN-SODIUM CHLORIDE 0.9% IV SOLN 30 UNIT/500ML 100 ML/HR: 30-0.9/5 SOLUTION at 10:00

## 2017-03-19 RX ADMIN — ACETAMINOPHEN 975 MG: 325 TABLET, FILM COATED ORAL at 23:11

## 2017-03-19 RX ADMIN — PHENYLEPHRINE HYDROCHLORIDE 100 MCG: 10 INJECTION, SOLUTION INTRAMUSCULAR; INTRAVENOUS; SUBCUTANEOUS at 09:14

## 2017-03-19 RX ADMIN — PHENYLEPHRINE HYDROCHLORIDE 200 MCG: 10 INJECTION, SOLUTION INTRAMUSCULAR; INTRAVENOUS; SUBCUTANEOUS at 09:00

## 2017-03-19 RX ADMIN — SENNOSIDES AND DOCUSATE SODIUM 1 TABLET: 8.6; 5 TABLET ORAL at 20:14

## 2017-03-19 RX ADMIN — BUPIVACAINE 20 ML: 13.3 INJECTION, SUSPENSION, LIPOSOMAL INFILTRATION at 10:20

## 2017-03-19 RX ADMIN — ACETAMINOPHEN 975 MG: 325 TABLET, FILM COATED ORAL at 15:23

## 2017-03-19 ASSESSMENT — LIFESTYLE VARIABLES: TOBACCO_USE: 0

## 2017-03-19 ASSESSMENT — COPD QUESTIONNAIRES: COPD: 0

## 2017-03-19 NOTE — ANESTHESIA PREPROCEDURE EVALUATION
Anesthesia Evaluation     . Pt has not had prior anesthetic       ROS/MED HX    ENT/Pulmonary:      (-) tobacco use, asthma and COPD   Neurologic:      (-) CVA, TIA and Neuropathy   Cardiovascular:        (-) hypertension, CAD, irregular heartbeat/palpitations and stent   METS/Exercise Tolerance:     Hematologic:        (-) anemia   Musculoskeletal:         GI/Hepatic:        (-) GERD and liver disease   Renal/Genitourinary:      (-) renal disease   Endo:      (-) Type I DM, Type II DM and thyroid disease   Psychiatric:         Infectious Disease:  - neg infectious disease ROS       Malignancy:         Other:               Physical Exam  Normal systems: cardiovascular, pulmonary and dental    Airway   Mallampati: II  TM distance: >3 FB  Neck ROM: full    Dental     Cardiovascular   Rhythm and rate: regular and normal      Pulmonary    breath sounds clear to auscultation             HPI: Julius Wilson is a  32 year old female with no significant PMH now with a twin IUP at 31w2d complicated by TTTS and no interval growth of twin A.    History and physical reviewed; no interval change.    Procedure: Procedure(s):   - Wound Class: I-Clean     NPO Status:   Adequate. > 6 hours.     PMHx/PSHx/ROS:  History reviewed. No pertinent past medical history.    History reviewed. No pertinent past surgical history.           Anesthesia Plan      History & Physical Review  History and physical reviewed and following examination; no interval change.    ASA Status:  2 .        Plan for Spinal and Periph. Nerve Block for postop pain   PONV prophylaxis:  Ondansetron (or other 5HT-3)  - post op TAP blocks for pain control      Postoperative Care  Postoperative pain management:  Peripheral nerve block (Single Shot), IV analgesics and Oral pain medications.      Consents  Anesthetic plan, risks, benefits and alternatives discussed with:  Patient.  Use of blood products discussed: Yes.   Use of blood products discussed with Patient.   Consented to blood products.  .        Daniel Hernandez MD  8:43 AM March 19, 2017

## 2017-03-19 NOTE — ANESTHESIA POSTPROCEDURE EVALUATION
Patient: Julius Wilson    Procedure(s):   - Wound Class: I-Clean    Diagnosis:Pregnancy  Diagnosis Additional Information: No value filed.    Anesthesia Type:  Spinal, Periph. Nerve Block for postop pain    Note:  Anesthesia Post Evaluation    Patient location during evaluation: OB PACU  Patient participation: Able to participate in evaluation but full recovery from regional anesthesia has not yet ocurrred but is anticipated to occur within 48 hours (spinal wearing off)  Level of consciousness: awake and alert  Pain management: adequate  Airway patency: patent  Cardiovascular status: acceptable  Respiratory status: acceptable  Hydration status: acceptable  PONV: none     Anesthetic complications: None          Last vitals:  Vitals:    03/19/17 1015 03/19/17 1030 03/19/17 1045   BP: 92/46 100/62 99/69   Resp:  18 30   Temp:   35.7  C (96.3  F)   SpO2: 100% 99% 99%         Electronically Signed By: Daniel Hernandez MD  March 19, 2017  11:08 AM

## 2017-03-19 NOTE — IP AVS SNAPSHOT
[facility]    [address] [address]    Phone:  [phone]                                       After Visit Summary   3/19/2017    Julius Wilson    MRN: [id number]           After Visit Summary Signature Page     I have received my discharge instructions, and my questions have been answered. I have discussed any challenges I see with this plan with the nurse or doctor.    ..........................................................................................................................................  Patient/Patient Representative Signature      ..........................................................................................................................................  Patient Representative Print Name and Relationship to Patient    ..................................................               ................................................  Date                                            Time    ..........................................................................................................................................  Reviewed by Signature/Title    ...................................................              ..............................................  Date                                                            Time

## 2017-03-19 NOTE — ANESTHESIA CARE TRANSFER NOTE
Patient: Julius Wilson    Procedure(s):   - Wound Class: I-Clean    Diagnosis: Pregnancy  Diagnosis Additional Information: No value filed.    Anesthesia Type:   Spinal, Periph. Nerve Block for postop pain     Note:  Airway :Room Air  Patient transferred to:PACU  Comments: .Anesthesia Care Transfer Note    Patient: Julius Wilson    Transferred to: PACU    Patient vital signs: stable    Airway: none    Monitors applied, VSS.  Patient awake and comfortable, breathing spontaneously.  Report given to RN with transfer of care.        Jess Dia CRNA  3/19/2017  10:13 AM        Vitals: (Last set prior to Anesthesia Care Transfer)    CRNA VITALS  3/19/2017 0943 - 3/19/2017 1013      3/19/2017             Resp Rate (set): 10                Electronically Signed By: YOLANDA Solo CRNA  March 19, 2017  10:13 AM

## 2017-03-19 NOTE — OP NOTE
[facility]   OB/GYN  Section Operative Note     Surgery Date: 3/19/2017     Surgeon:  Moon Guerrero MD     Assistants:  Sonia Chaves MD, PGY3     Pre-op Diagnosis:    1. Intrauterine pregnancy at 31w2d  2. Mono-di twin pregnancy  3. Twin twin tranfusion syndrome  4. Symmetric IUGR in Fetus 2  5. Lack of interval growth in Fetus 1  6. Rubella non immune status  7. Rh positive status     Post-op Diagnosis:    1. Same, s/p delivery  2. Two liveborn male infants     Procedure:  Primary low-transverse  section with double layer uterine closure via Pfannenstiel incision     Anesthesia: Spinal     EBL:  1000 mL     IVF:  1200 mL crystalloid     UOP:  400 mL clear urine at the end of the case     Drains: Driver catheter      Specimens:  Cord blood, placenta     Complications: None apparent    Indications:   Ms. Julius Wilson is a 32 year old  at 31w2d by IUI dating who presents for  section for pregnancy complicated by mono-di twins, TTTS, symetric IUGR in fetus 2 and lack of interval growth in fetus 1. Given abnormal growth of babies in the setting of Twin-twin transfusion syndrome, delivery was recommended. She received betamethasone on 3/3-3/4 and a rescue course 3/17-. She also was given a 6 g load of magnesium sulfate approximately 45 minutes prior to the  section for fetal neuroprotection.  The risks, benefits, and alternatives of  section were discussed with the patient, and she agreed to proceed.     Findings:   1. Clear amniotic fluid.  2. Fetus 1: liveborn male infant in cephalic presentation, vigorous and crying at delivery. Apgars 9 at 1 minute & 9 at 5 minutes. Weight 3 lb 12 oz.  3. Fetus 2: liveborn male infant in oblique presentation but converted to cephalic for delivery. Apgars assigned by NICU, weight 3 lb 0.3 oz.  4. Normal appearing uterus. 2 cm simple cyst on left ovary, small paratubal cyst on right fallopian tube.   5. No fascial  or intra-abdominal adhesions.    Procedure Details:   The patient was brought to the OR, where adequate spinal anesthesia was administered.  She was placed in the dorsal supine position with a slight leftward tilt. A brownlee catheter was placed into her bladder.  She was prepped and draped in the usual sterile fashion. A surgical time out was performed. A pfannenstiel skin incision was made with the scalpel, and carried down to the underlying fascia with sharp and blunt dissection. The fascia was incised in the midline, and the incision was extended laterally with the Brown scissors. The superior aspect of the fascia was grasped with the Kocher clamps and dissected off of the underlying rectus muscles with blunt and sharp dissection. Attention was then turned to the inferior aspect of the fascia, which was similarly dissected off of the underlying rectus muscles. The rectus muscles were  in the midline, and the peritoneum was entered.  The opening was extended with care to avoid the bladder. The bladder blade was placed. The vesicouterine peritoneum was incised in the midline, and the incision was extended laterally with the Metzenbaum scissors. A bladder flap was created and the bladder blade was replaced. A transverse hysterotomy was made with the scalpel in the lower uterine segment, and the incision was extended with digital pressure. Amniotomy was performed with return of clear fluid.  Fetus 1 was noted to be in the cephalic position, and was delivered atraumatically.  No nuchal cord was noted. The cord was doubly clamped and cut immediately, and the infant was handed off to the awaiting NICU staff. Fetus 2 was in transverse presentation and externally verted to cephalic presentation. The second fetus was delivered after amniotomy was performed with return of clear fluid. The cord was doubly clamped and cut and the infant was handed off to the NICU team. A segment of both cords were cut and held for  gases. The placenta was delivered with gentle traction on the umbilical cord and uterine massage. The uterus was exteriorized and cleared of all clots and debris. Uterine tone was noted to be slightly boggy but firmed up with 20U of pitocin given through the running IV and uterine massage.  The hysterotomy was closed with a running locked suture of 0 Vicryl. The hysterotomy was then imbricated using an 0 Vicryl suture. The hysterotomy was noted to be hemostatic. The ovaries and fallopian tubes were inspected. The left ovary was noted to have a simple 2 cm cyst which was drained. There was a small paratubal cyst on the right fallopian tube that was also drained. The posterior cul-de-sac was suctioned and cleared of all clots and debris. The uterus was returned to the abdomen. The pericolic gutters were irrigated and cleared of all clots and debris. The hysterotomy were reexamined and noted to be hemostatic. A layer of Seprafilm was placed over the hysterotomy. The fascia and rectus muscles were examined and areas of oozing were controlled with electrocautery. A second layer of Seprafilm was placed over the rectus muscles. The fascia was closed with a running 0 Vicryl suture. The subcutaneous tissue was irrigated and areas of oozing were controlled with electrocautery. The subcutaneous tissue closed with horizontal mattress sutures of 3-0 Vicryl. The skin was closed with 4-0 Monocryl and covered with steri strips a sterile dressing. The patient was cleaned. 800 mcg rectal misoprostol was placed after the procedure.    All sponge, needle, and instrument counts were correct. The patient tolerated the procedure well, and was transferred to recovery in stable condition. Dr. Guerrero was present and scrubbed for the entirety of the procedure.     Sonia Chaves MD  Ob/Gyn, PGY-3  3/19/2017    I was scrubbed and present for the entire procedure.  I have reviewed and edited the above note.    Moon Guerrero MD, FACOG

## 2017-03-19 NOTE — PLAN OF CARE
Problem: Goal Outcome Summary  Goal: Goal Outcome Summary  Outcome: Improving  Patient transferred to [facility]. Vitals stable. PP assessments wnl. Denies pain. Driver in place, patent. Good urine output. tolerating clear liquids. Passing gas. Continue to monitor pt.

## 2017-03-19 NOTE — PLAN OF CARE
Pt stable throughout PACU stay. Denies pain after spinal, toradol and TAP block. VSS, pumped x 1, pruritis resolving at end of PACU stay. Transferred to NICU to see babies then to PP, report given to MOE Espinosa who assumed care.

## 2017-03-19 NOTE — PLAN OF CARE
Data: Patient admitted to Triage Room 2 at 0641. Patient is a . Prenatal record reviewed.   Obstetric History       T0      TAB0   SAB0   E0   M0   L0       # Outcome Date GA Lbr Esdras/2nd Weight Sex Delivery Anes PTL Lv   1 Current               .  Medical History: No past medical history on file.  Gestational age 31w2d. Vital signs per doc flowsheet. Fetal movement present.Patient reports rare mild uterine cramping. Patient reports Scheduled  Section  as reason for admission. Support person Gallito present.  Action: Care of patient assumed at 0641. Verbal consent for EFM, external fetal monitors applied. Admission assessment completed. Patient and support persons educated on preoperative plan to start IV and that Dr. Gutierrez will come discuss plan. Patient instructed to report change in fetal movement, contractions, vaginal leaking of fluid or bleeding, abdominal pain, or any concerns related to the pregnancy to her nurse/physician. Patient oriented to room, call light in reach.   Response: Dr. Gutierrez informed of patient's arrival. Plan per provider is for Magnesium Sulfate 6 gram load prior to 0830  Section. Patient verbalized agreement with plan. Report given to JUAN Thomas RN circulator.

## 2017-03-19 NOTE — ANESTHESIA PROCEDURE NOTES
Peripheral Nerve Block Procedure Note    Staff:     Anesthesiologist:  MEHRAN MICHEL  Location: PACU  Procedure Start/Stop TImes:      3/19/2017 10:16 AM     3/19/2017 10:22 AM    patient identified, IV checked, site marked, risks and benefits discussed, informed consent, monitors and equipment checked, pre-op evaluation, at physician/surgeon's request and post-op pain management      Correct Patient: Yes      Correct Position: Yes      Correct Site: Yes      Correct Procedure: Yes      Correct Laterality:  Yes    Site Marked:  Yes  Procedure details:     Procedure:  TAP    Laterality:  Bilateral    Position:  Supine    Sterile Prep: chloraprep, mask and sterile gloves      Needle:  Insulated    Needle gauge:  21    Needle length (mm):  100    Ultrasound: Yes      Ultrasound used to identify targeted nerve, plexus, or vascular structure and placed a needle adjacent to it      Permanent Image entered into patiient's record      Abnormal pain on injection: No      Blood Aspirated: No      Paresthesias:  No    Bleeding at site: No      Bolus via:  Needle    Infusion Method:  Single Shot    Complications:  None  Assessment/Narrative:      Injected a total of 20 ml of exparel in 20 ml of saline, 20 ml of 0.25% bupivacaine with epi

## 2017-03-19 NOTE — H&P
[facility]  OB History and Physical      Julius Wilson MRN# [id number]   Age: 32 year old YOB: 1985     CC:  Presents for C/S    HPI:  Ms. Julius Wilson is a 32 year old  at 31w2d by IUI dating who presents for  section in pregnancy complicated by mono-di twins, TTTS, symetric IUGR in fetus 2 and lack of growth in fetus 1. She denies regular painfuly contractions, vaginal bleeding, and loss of fluid.   + normal fetal movement. She denies any nausea, vomiting, chest pain, shortness of breath, fever, chills.     Pregnancy Complications:  1.  Mono-di twins- fetus 1 cephalic with EFW 53% and fetus 2 oblique with EFW 33%. 14.8% discordance. Placenta posterior  2. Twin-twin transfusion syndrome- Stage 1. S/p amnio-reduction 3/3 and s/p BMZ 3/3 and 3/4 and rescue course on 3/17 and 3/18  3. Symmetric IUGR in Fetus 2  4. Lack of growth in Fetus 1  5. Rubella non-immune status      Prenatal Labs:   Lab Results   Component Value Date    ABO A 2017    RH  Pos 2017    AS Neg 2017    HGB 10.3 (L) 2017     From outside prenatal records:  HIV NR, RNI, GC/CT Neg, Hgb 11.8, Plt 273, RPR NR, Hep B S Ag NR, GCT 90    GBS Status:   No results found for: GBS    Ultrasounds  See Imaging Tab    3/17 [facility] US  IMPRESSION  ---------------------------------------------------------------------------------------------------------  1) Monochorionic diamniotic twin pregnancy at 31 0/7 weeks gestational age.  2) None of the anomalies commonly detected by ultrasound were evident in the fetal anatomic survey described above in either twin.  3) Growth parameters and estimated fetal weight were consistent with an appropriate for gestation age pattern of growth in both twins. The intertwin discordance is within  normal limits. However, there has essentially been no growth in the AC or overall growth in twin A since the prior US.  4) The amniotic fluid volume appeared  normal around both twins.  5) The umbilical artery doppler studies were within normal limits for both twins.    OB History      PMHx: Denies    PSHx: Denies    Meds:   Prescriptions Prior to Admission   Medication Sig Dispense Refill Last Dose     calcium carbonate (TUMS) 500 MG chewable tablet Take 1 chew tab by mouth daily   Past Week at Unknown time     folic acid (FOLVITE) 1 MG tablet    Past Week at Unknown time     Ferrous Sulfate (IRON SUPPLEMENT PO) Take 325 mg by mouth 2 times daily (with meals)   Past Week at Unknown time     Prenatal Vit-Fe Fumarate-FA (PRENATAL MULTIVITAMIN  PLUS IRON) 27-0.8 MG TABS per tablet Take 1 tablet by mouth daily   Past Week at Unknown time     calcium carbonate (OS-TIARRA 500 MG [place]. [place]) 500 MG tablet Take 500 mg by mouth 2 times daily   Past Week at Unknown time     VITAMIN D, CHOLECALCIFEROL, PO Take 2,000 Units by mouth daily   Past Week at Unknown time     betamethasone acet & sod phos (CELESTONE) 6 (3-3) MG/ML SUSP injection Inject 2 mLs (12 mg) into the muscle once for 1 dose 2 mL 0 Taking     Allergies:    Allergies   Allergen Reactions     Sulfamethoxazole-Trimethoprim Other (See Comments)     Stiff neck       FmHx: Denies FH of difficulties with anesthesia or blood clots  SocHx: She denies any tobacco, alcohol, or other drug use during this pregnancy.    ROS:   Complete 10-point ROS negative except as noted in HPI.    PE:    Vitals:    17 0651   BP: 99/66   Resp: 18   Temp: 97.7  F (36.5  C)   TempSrc: Oral     Gen: Well-appearing, NAD, comfortable   CV: rrr, no mrg   Pulm: Ctab, no wheezes or crackles   Abd: Soft, gravid, non-tender, +BS   Ext: trace LE edema b/l  Cx: Deferred         FHT 1: Baseline 140, mod variability, present accelerations, no decelerations   FHT 2: Baseline 140, mod variability, present accelerations, no decelerations   Coin: Uterine irritability    Assessment  Ms. Julius Wilson is a 32 year old , at 31w2d by IUI dating, who  presents for  section.    Plan  Admit to L&D  Labor: Anticipate primary low transverse  section. Risks and benefits of the primary low transverse  section discussed with patient including risk of blood loss, infection, and damage to surrounding structures. Patient had all of her questions answered. Written informed consent obtained.   FWB: Category 1 FHTx2.  Continue EFM and toco  Pain: Spinal for analgesia  Fetal neuro-protection: 6g loading dose of magnesium ordered and will be infused 45 minutes prior to incision for fetal neuro-protection  PNC: Rh Positive, Rubella non-immune- needs MMR post partum, GBS Not completed, GCT 90, Placenta Posterior    The patient was discussed with Dr. Guerrero who is in agreement with the treatment plan.    Hilda Almaguer MD  OBGYN PGY-2  7:27 AM 3/19/2017    The patient was seen and examined by me.  I have reviewed and agree with the above note.    Moon Guerrero MD, FACOG

## 2017-03-19 NOTE — ANESTHESIA PROCEDURE NOTES
Spinal/LP Procedure Note    Spinal Block  Staff:     Anesthesiologist:  MEHRAN MICHEL  Location: OB and OR  Procedure Start/Stop Times:     patient identified, IV checked, site marked, risks and benefits discussed, informed consent, monitors and equipment checked and pre-op evaluation      Correct Patient: Yes      Correct Position: Yes      Correct Site: Yes      Correct Procedure: Yes      Correct Laterality:  N/A  Procedure:     Procedure:  Intrathecal    Position:  Sitting    Sterile Prep: chloraprep, mask, sterile gloves and patient draped      Approach:  Midline    Needle Type:  Kathleen    Needle gauge (G):  25    Local Skin Infiltration:  1% lidocaine    amount (ml):  3    Needle Length (in):  3.5    Introducer used: Yes      Introducer gauge:  20 G    Attempts:  1    Redirects:  0    CSF:  Clear    Paresthesias:  No  Assessment/Narrative:      Injected 1.8 mL of 0.75% bupivacaine and 0.2 mg duramorph

## 2017-03-19 NOTE — DISCHARGE SUMMARY
DELIVERY DISCHARGE SUMMARY    Admit date: 3/19/2017    Discharge date: 3/22/2017      Admit Dx:   1. 33 yo  at 31w2d  2. Mono-di twin pregnancy  3. Twin twin tranfusion syndrome  4. Symmetric IUGR in Fetus 2  5. Lack of growth in Fetus 1  6. Rubella non immune status  7. Rh positive status    Discharge Dx:  1. , s/p procedure as below, delivered at 31w2d   2. Acute blood loss anemia- asymptomatic    Procedures:  - Fetal and uterine monitoring   - Magnesium for neuroprotection  - S/p betamethasone and rescue course of betamethasone  - Spinal analgesia  - PLTCS via Pfannenstiel Incision with double layer uterine closure  - Serial laboratory monitoring     Admit HPI:  Ms. Julius Wilson is a 32 year old  at 31w2d by IUI dating who presents for  section in pregnancy complicated by mono-di twins, TTTS, symetric IUGR in fetus 2 and lack of growth in fetus 1. Given abnormal growth of babies in the setting of Twin-twin transfusion syndrome, delivery was recommended. She received betamethasone on 3/3-3/4 and a rescue course 3/17-. She also was given a 6 g load of magnesium sulfate approximately 45 minutes prior to the  section for neuroprotection. The risks, benefits, and alternatives of  section were discussed with the patient, and she agreed to proceed.  Please see her admit H&P for full details of her PMH, PSH, Meds, Allergies and exam on admit.    Hospital course:  Julius Wilson was admitted to the hospital on 3/19/2017 for the above listed indications.     On 3/19/17 at 0924 and 0925 am, she delivered two vigorous, viable male infants in the cephalic position.  Weight was 3 lb 12 oz for fetus 1 and 3 lb 0.3 oz for fetus 2. EBL from the delivery was 1000 cc secondary to uterine atony. She was given 20 units of pitocin and 800 mcg rectal misoprostol with good subsequent uterine tone. Please see her  Section Operative Note for full details regarding her  delivery.    Her postoperative course was complicated by the following:   --Acute blood loss anemia: Her hemoglobin prior to delivery was 10.3 and her EBL was 1000 cc. Hgb stable at 8.9 on day of discharge.       On POD#3, she was meeting all of her postpartum goals and deemed stable for discharge. She was voiding without difficulty, tolerating a regular diet without nausea and vomiting, her pain was well controlled on oral pain medicines and her lochia was appropriate. At the time of discharge, she was feeding her infant and desired nothing for contraception.     Given her rubella non-immune status, she received the MMR vaccine prior to discharge.     Discharge/Disposition:  Julius Wilson was discharged to home in stable condition with the following instructions/medications:  1) Call for temperature > 100.4, foul smelling vaginal discharge, bleeding > 1 pad per hour x 2 hrs, pain not controlled by oral pain meds, severe constipation or severe nausea or vomiting.  2) She desired nothing for contraception  3) She was instructed to follow-up with her primary OB in 6 weeks for a routine postpartum visit.   4) She was instructed to continue her PNV on discharge if she wished to breast feed her infant.  5) She was discharged home with the following medications:     Julius Wilson   Home Medication Instructions MELYSSA:[id number]    Printed on:03/22/17 1700   Medication Information                      acetaminophen (TYLENOL) 325 MG tablet  Take 2 tablets (650 mg) by mouth every 4 hours as needed for other (surgical pain)             betamethasone acet & sod phos (CELESTONE) 6 (3-3) MG/ML SUSP injection  Inject 2 mLs (12 mg) into the muscle once for 1 dose             calcium carbonate (OS-TIARRA 500 MG [place]. [place]) 500 MG tablet  Take 500 mg by mouth 2 times daily             calcium carbonate (TUMS) 500 MG chewable tablet  Take 1 chew tab by mouth daily             ferrous sulfate (IRON SUPPLEMENT) 325 (65 FE) MG  tablet  Take 1 tablet (325 mg) by mouth daily (with breakfast)             folic acid (FOLVITE) 1 MG tablet               ibuprofen (ADVIL/MOTRIN) 400 MG tablet  Take 1-2 tablets (400-800 mg) by mouth every 6 hours as needed for other (cramping)             oxyCODONE (ROXICODONE) 5 MG IR tablet  Take 1-2 tablets (5-10 mg) by mouth every 4 hours as needed for moderate to severe pain             Prenatal Vit-Fe Fumarate-FA (PRENATAL MULTIVITAMIN  PLUS IRON) 27-0.8 MG TABS per tablet  Take 1 tablet by mouth daily             senna-docusate (SENOKOT-S;PERICOLACE) 8.6-50 MG per tablet  Take 1-2 tablets by mouth 2 times daily as needed for constipation             VITAMIN D, CHOLECALCIFEROL, PO  Take 2,000 Units by mouth daily               Bess Curry MD, FACOG  [facility] Staff  OB/GYN    3/22/2017  5:01 PM

## 2017-03-19 NOTE — BRIEF OP NOTE
[facility]   Brief Operative Progress Note     Surgery Date: 3/19/2017    Surgeon:  Moon Guerrero MD    Assistants:  Sonia Chaves MD, PGY3    Pre-op Diagnosis:    1. Intrauterine pregnancy at 31w2d  2. Mono-di twin pregnancy  3.  Twin twin tranfusion syndrome  4. Symmetric IUGR in Fetus 2  5. Lack of growth in Fetus 1  6. Rubella non immune status  7. Rh positive status    Post-op Diagnosis:    1. Same, s/p delivery  2. Two viable male infants    Procedure:  Primary low-transverse  section with double layer uterine closure via Pfannenstiel incision    Anesthesia: Spinal    EBL:  1000 mL    IVF:  1200 mL crystalloid    UOP:  400 mL clear urine at the end of the case    Drains: Driver catheter     Specimens:  Cord blood, placenta    Complications: None apparent    Findings:   1. Clear amniotic fluid  2. Fetus 1: Viable male infant in cephalic presentation. Apgars 9 at 1 minute & 9 at 5 minutes. Weight pending.  3. Fetus 2: Viable male infant in oblique presentation but converted to cephalic for delivery. Apgars assigned by NICU, weight pending.  4. Normal appearing uterus. 2 cm simple cyst on left ovary, small paratubal cyst on right fallopian tube.   5. No fascial or intra-abdominal adhesions.    Disposition:  Stable to PACU    Sonia Chaves MD  OB/GYN Resident PGY3  3/19/2017

## 2017-03-19 NOTE — IP AVS SNAPSHOT
MRN:[id number]                      After Visit Summary   3/19/2017    Julius Wilson    MRN: [id number]           Thank you!     Thank you for choosing [facility] for your care. Our goal is always to provide you with excellent care. Hearing back from our patients is one way we can continue to improve our services. Please take a few minutes to complete the written survey that you may receive in the mail after you visit with us. Thank you!        Patient Information     Date Of Birth          1985        About your hospital stay     You were [facility] on:  [facility]    You were discharged on:  2017        Reason for your hospital stay       Delivery of your twins                  Who to Call     For medical emergencies, please call 911.  For non-urgent questions about your medical care, please call your primary care provider or clinic, None  For questions related to your surgery, please call your surgery clinic        Attending Provider     Provider Specialty    Moon Guerrero MD OB/Gyn       Primary Care Provider    Physician No Ref-Primary       No address on file        After Care Instructions     Activity       Review discharge instructions            Diet       Resume previous diet            Discharge Instructions - Postpartum visit       Schedule postpartum visit with your provider and return to clinic in 6 weeks.                  Follow-up Appointments     Adult [facility] Follow-up and recommended labs and tests       Follow-up with your regular OB provider in 6 weeks.     Appointments on [place] and/or [facility] (with [facility] or [facility] provider or service). Call [phone] if you haven't heard regarding these appointments within 7 days of discharge.                  Further instructions from your care team       Postop  Birth Instructions    Activity       Do not lift more than 10 pounds for 6 weeks after  surgery.  Ask family and friends for help when you need it.    No driving until you have stopped taking your pain medications (usually two weeks after surgery).    No heavy exercise or activity for 6 weeks.  Don't do anything that will put a strain on your surgery site.    Don't strain when using the toilet.  Your care team may prescribe a stool softener if you have problems with your bowel movements.     To care for your incision:       Keep the incision clean and dry.    Do not soak your incision in water. No swimming or hot tubs until it has fully healed. You may soak in the bathtub if the water level is below your incision.    Do not use peroxide, gel, cream, lotion, or ointment on your incision.    Adjust your clothes to avoid pressure on your surgery site (check the elastic in your underwear for example).     You may see a small amount of clear or pink drainage and this is normal.  Check with your health care provider:       If the drainage increases or has an odor.    If the incision reddens, you have swelling, or develop a rash.    If you have increased pain and the medicine we prescribed doesn't help.    If you have a fever above 100.4 F (38 C) with or without chills when placing thermometer under your tongue.   The area around your incision (surgery wound), will feel numb.  This is normal. The numbness should go away in less than a year.     Keep your hands clean:  Always wash your hands before touching your incision (surgery wound). This helps reduce your risk of infection. If your hands aren't dirty, you may use an alcohol hand-rub to clean your hands. Keep your nails clean and short.    Call your healthcare provider if you have any of these symptoms:       You soak a sanitary pad with blood within 1 hour, or you see blood clots larger than a golf ball.    Bleeding that lasts more than 6 weeks.    Vaginal discharge that smells bad.    Severe pain, cramping or tenderness in your lower belly area.    A  "need to urinate more frequently (use the toilet more often), more urgently (use the toilet very quickly), or it burns when you urinate.    Nausea and vomiting.    Redness, swelling or pain around a vein in your leg.    Problems breastfeeding or a red or painful area on your breast.    Chest pain and cough or are gasping for air.    Problems with coping with sadness, anxiety or depression. If you have concerns about hurting yourself or the baby, call your provider immediately.      You have questions or concerns after you return home.                  Pending Results     No orders found from 3/17/2017 to 3/20/2017.            Statement of Approval     Ordered          17 1613  I have reviewed and agree with all the recommendations and orders detailed in this document.  EFFECTIVE NOW     Approved and electronically signed by:  Bess Curry MD             Admission Information     Date & Time Provider Department Dept. Phone    3/19/2017 Moon Guerrero MD [facility] [phone]      Your Vitals Were     Blood Pressure Pulse Temperature Respirations Height Last Period     104 98  F (36.7  C) (Oral) 18 1.651 m (5' 5\") 2016    Pulse Oximetry                   100%           MyChart Information     [employer] lets you send messages to your doctor, view your test results, renew your prescriptions, schedule appointments and more. To sign up, go to [email or web address] . Click on \"Log in\" on the left side of the screen, which will take you to the Welcome page. Then click on \"Sign up Now\" on the right side of the page.     You will be asked to enter the access code listed below, as well as some personal information. Please follow the directions to create your username and password.     Your access code is: [email or web address]  Expires: 2017  4:15 PM     Your access code will  in 90 days. If you need help or a new code, please call your [place] clinic or [phone].        Care " EveryWhere ID     This is your Care EveryWhere ID. This could be used by other organizations to access your [place] medical records  [id number]           Review of your medicines      START taking        Dose / Directions    acetaminophen 325 MG tablet   Commonly known as:  TYLENOL        Dose:  650 mg   Take 2 tablets (650 mg) by mouth every 4 hours as needed for other (surgical pain)   Quantity:  40 tablet   Refills:  0       ibuprofen 400 MG tablet   Commonly known as:  ADVIL/MOTRIN        Dose:  400-800 mg   Take 1-2 tablets (400-800 mg) by mouth every 6 hours as needed for other (cramping)   Quantity:  30 tablet   Refills:  0       oxyCODONE 5 MG IR tablet   Commonly known as:  ROXICODONE        Dose:  5-10 mg   Take 1-2 tablets (5-10 mg) by mouth every 4 hours as needed for moderate to severe pain   Quantity:  15 tablet   Refills:  0       senna-docusate 8.6-50 MG per tablet   Commonly known as:  SENOKOT-S;PERICOLACE        Dose:  1-2 tablet   Take 1-2 tablets by mouth 2 times daily as needed for constipation   Quantity:  30 tablet   Refills:  0         CONTINUE these medicines which may have CHANGED, or have new prescriptions. If we are uncertain of the size of tablets/capsules you have at home, strength may be listed as something that might have changed.        Dose / Directions    ferrous sulfate 325 (65 FE) MG tablet   Commonly known as:  IRON SUPPLEMENT   Indication:  Anemia From Inadequate Iron in the Body   This may have changed:    - medication strength  - when to take this   Used for:  Anemia due to blood loss, acute        Dose:  325 mg   Take 1 tablet (325 mg) by mouth daily (with breakfast)   Quantity:  60 tablet   Refills:  0         CONTINUE these medicines which have NOT CHANGED        Dose / Directions    betamethasone acet & sod phos 6 (3-3) MG/ML Susp injection   Commonly known as:  CELESTONE   Used for:  Monochorionic diamniotic twin pregnancy, Twin to twin transfusion, third trimester         Dose:  12 mg   Inject 2 mLs (12 mg) into the muscle once for 1 dose   Quantity:  2 mL   Refills:  0       calcium carbonate 500 MG chewable tablet   Commonly known as:  TUMS        Dose:  1 chew tab   Take 1 chew tab by mouth daily   Refills:  0       calcium carbonate 500 MG tablet   Commonly known as:  OS-TIARRA 500 mg [place]. [place]        Dose:  500 mg   Take 500 mg by mouth 2 times daily   Refills:  0       folic acid 1 MG tablet   Commonly known as:  FOLVITE        Refills:  0       prenatal multivitamin  plus iron 27-0.8 MG Tabs per tablet        Dose:  1 tablet   Take 1 tablet by mouth daily   Refills:  0       VITAMIN D (CHOLECALCIFEROL) PO        Dose:  2000 Units   Take 2,000 Units by mouth daily   Refills:  0            Where to get your medicines      These medications were sent to [facility] [place], [place] - [address]  [address] [facility] [address] [address]     Phone:  [phone]     acetaminophen 325 MG tablet    ferrous sulfate 325 (65 FE) MG tablet    ibuprofen 400 MG tablet    senna-docusate 8.6-50 MG per tablet         Some of these will need a paper prescription and others can be bought over the counter. Ask your nurse if you have questions.     Bring a paper prescription for each of these medications     oxyCODONE 5 MG IR tablet                Protect others around you: Learn how to safely use, store and throw away your medicines at www.disposemymeds.org.             Medication List: This is a list of all your medications and when to take them. Check marks below indicate your daily home schedule. Keep this list as a reference.      Medications           Morning Afternoon Evening Bedtime As Needed    acetaminophen 325 MG tablet   Commonly known as:  TYLENOL   Take 2 tablets (650 mg) by mouth every 4 hours as needed for other (surgical pain)   Last time this was given:  650 mg on 3/22/2017  2:45 PM                                betamethasone acet & sod phos 6 (3-3)  MG/ML Susp injection   Commonly known as:  CELESTONE   Inject 2 mLs (12 mg) into the muscle once for 1 dose                                calcium carbonate 500 MG chewable tablet   Commonly known as:  TUMS   Take 1 chew tab by mouth daily                                calcium carbonate 500 MG tablet   Commonly known as:  OS-TIARRA 500 mg [place]. [place]   Take 500 mg by mouth 2 times daily                                ferrous sulfate 325 (65 FE) MG tablet   Commonly known as:  IRON SUPPLEMENT   Take 1 tablet (325 mg) by mouth daily (with breakfast)                                folic acid 1 MG tablet   Commonly known as:  FOLVITE                                ibuprofen 400 MG tablet   Commonly known as:  ADVIL/MOTRIN   Take 1-2 tablets (400-800 mg) by mouth every 6 hours as needed for other (cramping)   Last time this was given:  800 mg on 3/22/2017  2:54 PM                                oxyCODONE 5 MG IR tablet   Commonly known as:  ROXICODONE   Take 1-2 tablets (5-10 mg) by mouth every 4 hours as needed for moderate to severe pain   Last time this was given:  5 mg on 3/22/2017  4:34 PM                                prenatal multivitamin  plus iron 27-0.8 MG Tabs per tablet   Take 1 tablet by mouth daily                                senna-docusate 8.6-50 MG per tablet   Commonly known as:  SENOKOT-S;PERICOLACE   Take 1-2 tablets by mouth 2 times daily as needed for constipation   Last time this was given:  2 tablets on 3/22/2017  7:36 AM                                VITAMIN D (CHOLECALCIFEROL) PO   Take 2,000 Units by mouth daily

## 2017-03-19 NOTE — PLAN OF CARE
Problem: Perioperative Period (Adult)  Goal: Signs and Symptoms of Listed Potential Problems Will be Absent or Manageable (Perioperative Period)  Signs and symptoms of listed potential problems will be absent or manageable by discharge/transition of care (reference Perioperative Period (Adult) CPG).  Outcome: Completed Date Met:  03/19/17  Pt to BP for scheduled c/s r/t TTTS. Pt reports anxiety but ready to see babies. Many questions and appropriate questions for staff and writer. Discuss preop procedures e.g. SCDs, IV, NPO, bicitra, MgSO4 load, abd prep and OR and PACU routines. Enc question, provide enc and support to anxious mother.

## 2017-03-20 LAB — HGB BLD-MCNC: 8.9 G/DL (ref 11.7–15.7)

## 2017-03-20 PROCEDURE — 25000132 ZZH RX MED GY IP 250 OP 250 PS 637: Performed by: OBSTETRICS & GYNECOLOGY

## 2017-03-20 PROCEDURE — 36415 COLL VENOUS BLD VENIPUNCTURE: CPT | Performed by: OBSTETRICS & GYNECOLOGY

## 2017-03-20 PROCEDURE — 25000128 H RX IP 250 OP 636: Performed by: OBSTETRICS & GYNECOLOGY

## 2017-03-20 PROCEDURE — 85018 HEMOGLOBIN: CPT | Performed by: OBSTETRICS & GYNECOLOGY

## 2017-03-20 PROCEDURE — 12000030 ZZH R&B OB INTERMEDIATE UMMC

## 2017-03-20 RX ORDER — IBUPROFEN 400 MG/1
400-800 TABLET, FILM COATED ORAL EVERY 6 HOURS PRN
Qty: 30 TABLET | Refills: 0 | Status: SHIPPED | OUTPATIENT
Start: 2017-03-20 | End: 2021-11-29

## 2017-03-20 RX ORDER — ACETAMINOPHEN 325 MG/1
650 TABLET ORAL EVERY 4 HOURS PRN
Qty: 40 TABLET | Refills: 0 | Status: SHIPPED | OUTPATIENT
Start: 2017-03-22 | End: 2021-11-29

## 2017-03-20 RX ORDER — OXYCODONE HYDROCHLORIDE 5 MG/1
5-10 TABLET ORAL EVERY 4 HOURS PRN
Qty: 15 TABLET | Refills: 0 | Status: SHIPPED | OUTPATIENT
Start: 2017-03-20 | End: 2021-11-29

## 2017-03-20 RX ORDER — AMOXICILLIN 250 MG
1-2 CAPSULE ORAL 2 TIMES DAILY PRN
Qty: 30 TABLET | Refills: 0 | Status: SHIPPED | OUTPATIENT
Start: 2017-03-20 | End: 2021-11-29

## 2017-03-20 RX ORDER — FERROUS SULFATE 325(65) MG
325 TABLET ORAL
Qty: 60 TABLET | Refills: 0 | Status: SHIPPED | OUTPATIENT
Start: 2017-03-20 | End: 2021-11-29

## 2017-03-20 RX ADMIN — ACETAMINOPHEN 975 MG: 325 TABLET, FILM COATED ORAL at 16:48

## 2017-03-20 RX ADMIN — ACETAMINOPHEN 975 MG: 325 TABLET, FILM COATED ORAL at 07:52

## 2017-03-20 RX ADMIN — KETOROLAC TROMETHAMINE 30 MG: 30 INJECTION, SOLUTION INTRAMUSCULAR at 12:25

## 2017-03-20 RX ADMIN — SENNOSIDES AND DOCUSATE SODIUM 2 TABLET: 8.6; 5 TABLET ORAL at 07:53

## 2017-03-20 RX ADMIN — IBUPROFEN 800 MG: 400 TABLET ORAL at 18:33

## 2017-03-20 RX ADMIN — KETOROLAC TROMETHAMINE 30 MG: 30 INJECTION, SOLUTION INTRAMUSCULAR at 05:06

## 2017-03-20 RX ADMIN — SIMETHICONE CHEW TAB 80 MG 80 MG: 80 TABLET ORAL at 05:04

## 2017-03-20 NOTE — LACTATION NOTE
D: I met with Julius vazquez today. Meir and Jaiden are her first babies. Julius is normally in good health, takes no medications, and has no history of breast/chest surgery or trauma. She has already started to pump.   I: I gave her a folder of introductory materials and went over pumping guidelines.  We talked about hands on pumping techniques, hand expression and how to access the [place] websites. We worked together on hand expression; she is getting about 5 ml each time. I encouraged her to call her insurer about coverage for a rental [employer], she has already received a Pump in Style through them (though it is not appropriate for her situation). She found out she does have coverage through [facility]. I talked with her nurse about dispensing a pump to her. I went through settings on a rental pump. Julius hopes to board for a couple nights after discharge, she is aware of how tight the boarding rooms are.  A: Mom working on bringing in supply for her new twins.  P: Will continue to provide lactation support.      Isabel Dodge, RNC, IBCLC

## 2017-03-20 NOTE — PLAN OF CARE
Problem: Goal Outcome Summary  Goal: Goal Outcome Summary  Outcome: Improving  Patient is stable, has good pain control and voiding freely .Ambulating well in the room and  has been to NICU by wheelchair and tolerated it well. She's pumping for baby in NICU and getting good amount. Will continue with plan of care.

## 2017-03-20 NOTE — PROGRESS NOTES
"Postpartum Progress Note  Julius Wilson  [id number]    Subjective:   Julius is doing well this morning. Reports that pain is well controlled. She wants to avoid taking roxicodone because of pumping. She has been good so far after TAP blocks and tylenol/ibuprofen. She is eating and drinking without nausea/vomiting. Has ambulated to the bathroom and voided a small amount after brownlee was removed. Denies CP, headache, SOB.     Objective:  BP 95/68  Temp 98.1  F (36.7  C) (Oral)  Resp 16  Ht 1.651 m (5' 5\")  LMP 2016  SpO2 100%  Breastfeeding? Unknown    I/O last 3 completed shifts:  In: 2980 [P.O.:480; I.V.:2500]  Out: 5050 [Urine:4050; Blood:1000]    General: NAD, alert, sitting comfortably  Heart: RRR  Lungs: CTAB, no wheezes/crackles  Abdomen: soft, appropriately tender to palpation, nondistended, fundus firm below U  Incision:  Pfannenstiel covered with bandage, no erythema or drainage  Extremities: warm and well perfused, SCDs on, trace edema    Labs:  Hemoglobin   Date Value Ref Range Status   2017 8.9 (L) 11.7 - 15.7 g/dL Final   2017 10.3 (L) 11.7 - 15.7 g/dL Final       Assessment/Plan:  Julius Wilson is a 32 year old  who is POD#1 s/p PLTCS for mono-di twins with TTTS.  Currently stable and doing well  - Routine post-op cares  - Advance to regular diet, brownlee removed and voiding spontaneously  - Pain: s/p TAPs, continue tylenol/ibuprofen, roxicodone PRN  - Bowel regimen for constipation prevention  - Heme: Hgb 10.3 > EBL 1000> 8.9, asymptomatic. Will DC home on iron supplementation.  - Rh positive, Rubella immune  - Babies: doing well in NICU, off CPAP  - Contraception: did not discuss this morning, patient had IUI w/ donor sperm this pregnancy  - Dispo: d/c to home on POD#3 or when meeting postop goals    Sonia Chaves MD  OB/GYN PGY3    Attempted to see patient x 3. Chart reviewed, continue current cares.   Demetria Echols MD    "

## 2017-03-20 NOTE — PLAN OF CARE
Problem: Goal Outcome Summary  Goal: Goal Outcome Summary  Outcome: Improving  VSS. Mom ambulates with assist stand by and c/o dizziness when up and walking. Driver is still in and mom has excellent urine output. IV was saline locked after Pitocin finished infusing. Pain is being controlled with tylenol and IV Toradol. Mom pumps q3h independently and brings milk down to babies in NICU. Mom is eager to visit babies in NICU.  at bedside during day and evening for support.

## 2017-03-20 NOTE — PLAN OF CARE
Problem: Goal Outcome Summary  Goal: Goal Outcome Summary  Outcome: Improving  Vital signs and assessments WDL. Verbalizes pain (pressure) is manageable with aromatherapy, binder, hot packs, and medication (see MAR). Denies oxycodone. Itching, denies Benadryl. Urethral catheter in place, adequate output; planning to remove this shift d/t dizziness at start of shift. No BM since delivery, passing gas, ambulating with standby assistance. Pumping every 4 hours overnight for twins in NICU, hand expressing after. Nipple red and painful, increased flange size prior to shift, tried hydrogels but did not find relief. Encouraged to breast massage. Speaks positively about newborns and is hopeful. EDS 6. Needs MMR. Will continue to monitor.      Driver removed around 0515. Due to void.

## 2017-03-21 LAB — COPATH REPORT: NORMAL

## 2017-03-21 PROCEDURE — 25000132 ZZH RX MED GY IP 250 OP 250 PS 637: Performed by: OBSTETRICS & GYNECOLOGY

## 2017-03-21 PROCEDURE — 12000030 ZZH R&B OB INTERMEDIATE UMMC

## 2017-03-21 RX ADMIN — SIMETHICONE CHEW TAB 80 MG 80 MG: 80 TABLET ORAL at 11:28

## 2017-03-21 RX ADMIN — IBUPROFEN 800 MG: 400 TABLET ORAL at 17:46

## 2017-03-21 RX ADMIN — SENNOSIDES AND DOCUSATE SODIUM 2 TABLET: 8.6; 5 TABLET ORAL at 09:15

## 2017-03-21 RX ADMIN — ACETAMINOPHEN 975 MG: 325 TABLET, FILM COATED ORAL at 09:15

## 2017-03-21 RX ADMIN — ACETAMINOPHEN 975 MG: 325 TABLET, FILM COATED ORAL at 17:46

## 2017-03-21 RX ADMIN — ACETAMINOPHEN 975 MG: 325 TABLET, FILM COATED ORAL at 00:45

## 2017-03-21 RX ADMIN — IBUPROFEN 800 MG: 400 TABLET ORAL at 06:54

## 2017-03-21 RX ADMIN — SIMETHICONE CHEW TAB 80 MG 80 MG: 80 TABLET ORAL at 00:58

## 2017-03-21 RX ADMIN — IBUPROFEN 800 MG: 400 TABLET ORAL at 11:28

## 2017-03-21 RX ADMIN — SENNOSIDES AND DOCUSATE SODIUM 2 TABLET: 8.6; 5 TABLET ORAL at 20:41

## 2017-03-21 RX ADMIN — SIMETHICONE CHEW TAB 80 MG 80 MG: 80 TABLET ORAL at 06:53

## 2017-03-21 RX ADMIN — IBUPROFEN 800 MG: 400 TABLET ORAL at 00:45

## 2017-03-21 RX ADMIN — IBUPROFEN 800 MG: 400 TABLET ORAL at 22:59

## 2017-03-21 NOTE — PROGRESS NOTES
"Postpartum Progress Note  Julius Wilson  [id number]    Subjective:   Having a lot of pain this morning.  Trying to avoid the oxycodone due to concerns of this getting to babies, but states she realizes she might need to start taking it.  Bleeding is minimal.  Has been up ambulating around and spending much time in NICU with babies.  Passing gas and taking simethicone to avoid bloating contributing to her discomfort.  Does have some shoulder pain she attributes to gas pains.    Objective:  /68  Temp 97.7  F (36.5  C) (Oral)  Resp 24  Ht 1.651 m (5' 5\")  LMP 2016  SpO2 100%  Breastfeeding? Unknown    General: NAD, alert, sitting comfortably  Heart: regular rate  Lungs: breathing comfortably  Abdomen: soft, appropriately tender to palpation, nondistended, fundus firm below U  Incision:  Pfannenstiel clean/dry/intact; covered with steristrips  Extremities: warm and well perfused, SCDs on, trace edema    Labs:  Hemoglobin   Date Value Ref Range Status   2017 8.9 (L) 11.7 - 15.7 g/dL Final   2017 10.3 (L) 11.7 - 15.7 g/dL Final       Assessment/Plan:  Julius Wilson is a 32 year old  who is POD#2 s/p PLTCS for mono-di twins with TTTS.  Currently stable and doing well  - Routine post-op cares  - Pain: s/p TAPs, continue tylenol/ibuprofen, roxicodone PRN.  Reviewed importance of taking pain medications if needed.  Reviewed limited amount getting through breast milk and overall safety for babies.  - Bowel regimen for constipation prevention  - Heme: Hgb 10.3 > EBL 1000> 8.9, ABLA asymptomatic. Will DC home on iron supplementation.  - Rh positive, Rubella immune  - Babies: doing well in NICU, off CPAP  - Contraception: not discussed; patient had IUI w/ donor sperm this pregnancy  - Dispo: d/c to home on POD#3 or when meeting postop goals    Dominique Cortes MD  OB/GYN Resident, PGY3  17 7:31 AM     OB/GYN Staff -- Pt seen and examined by me. Agree with note as above.  MD Gricel      "

## 2017-03-21 NOTE — PLAN OF CARE
Problem: Goal Outcome Summary  Goal: Goal Outcome Summary  Outcome: Improving  Claimed she is comfortable, gas pain does not bother her anymore. Pain medications are helping and able to move around and see babies in NICU. Continues to pump with good result.  Will continue with plan of care.

## 2017-03-21 NOTE — PLAN OF CARE
Problem: Goal Outcome Summary  Goal: Goal Outcome Summary  Outcome: Improving  Vital signs and assessments WDL. Dressing removed after shower, no bleeding/drainage. Patient is having gas pain, given hot packs and medication (see MAR), encouraged to ambulate. Voiding, has had BM since delivery; passing gas, ambulating independently. Pumping independently for twins in NICU, breast massaging before. Speaks positively about newborns. Will continue to monitor.

## 2017-03-21 NOTE — PLAN OF CARE
Problem: Goal Outcome Summary  Goal: Goal Outcome Summary  Outcome: Improving  VSS. Postpartum assessment WNL. Pain controlled with PO pain meds and heat packs. Pt had bowel movement this evening. Patient pumping independently. Ambulating well in room and to NICU. Encouraged patient to watch educational videos. Pt plans to shower this evening.

## 2017-03-22 VITALS
HEIGHT: 65 IN | RESPIRATION RATE: 18 BRPM | DIASTOLIC BLOOD PRESSURE: 84 MMHG | OXYGEN SATURATION: 100 % | HEART RATE: 104 BPM | TEMPERATURE: 98 F | SYSTOLIC BLOOD PRESSURE: 102 MMHG

## 2017-03-22 PROCEDURE — 90707 MMR VACCINE SC: CPT | Performed by: OBSTETRICS & GYNECOLOGY

## 2017-03-22 PROCEDURE — 25000125 ZZHC RX 250: Performed by: OBSTETRICS & GYNECOLOGY

## 2017-03-22 PROCEDURE — 25000132 ZZH RX MED GY IP 250 OP 250 PS 637: Performed by: OBSTETRICS & GYNECOLOGY

## 2017-03-22 RX ADMIN — ACETAMINOPHEN 650 MG: 325 TABLET, FILM COATED ORAL at 18:19

## 2017-03-22 RX ADMIN — SENNOSIDES AND DOCUSATE SODIUM 2 TABLET: 8.6; 5 TABLET ORAL at 07:36

## 2017-03-22 RX ADMIN — ACETAMINOPHEN 650 MG: 325 TABLET, FILM COATED ORAL at 10:46

## 2017-03-22 RX ADMIN — OXYCODONE HYDROCHLORIDE 5 MG: 5 TABLET ORAL at 16:34

## 2017-03-22 RX ADMIN — SIMETHICONE CHEW TAB 80 MG 80 MG: 80 TABLET ORAL at 07:36

## 2017-03-22 RX ADMIN — IBUPROFEN 800 MG: 400 TABLET ORAL at 14:54

## 2017-03-22 RX ADMIN — ACETAMINOPHEN 975 MG: 325 TABLET, FILM COATED ORAL at 02:55

## 2017-03-22 RX ADMIN — IBUPROFEN 800 MG: 400 TABLET ORAL at 06:12

## 2017-03-22 RX ADMIN — MEASLES, MUMPS, AND RUBELLA VIRUS VACCINE LIVE 0.5 ML: 1000; 12500; 1000 INJECTION, POWDER, LYOPHILIZED, FOR SUSPENSION SUBCUTANEOUS at 16:35

## 2017-03-22 RX ADMIN — ACETAMINOPHEN 650 MG: 325 TABLET, FILM COATED ORAL at 14:45

## 2017-03-22 RX ADMIN — OXYCODONE HYDROCHLORIDE 5 MG: 5 TABLET ORAL at 00:02

## 2017-03-22 NOTE — PLAN OF CARE
Problem: Goal Outcome Summary  Goal: Goal Outcome Summary  Outcome: Improving  Pt doing well overnight. VSS. Pain well controlled with oral medications (See MAR). Ambulating in room, voiding without difficulty. Showered this evening. Walking to NICU throughout the night. Pumping independently. Continue with plan of care.

## 2017-03-22 NOTE — PLAN OF CARE
Problem: Goal Outcome Summary  Goal: Goal Outcome Summary  Outcome: Improving  Stable, able to nap in between cares/pumping, getting more breast milk now from pumping. Passing gas but no bowel movement yet. Feeling ready to be discharged today, boarder room is available for her in NICU per patient.

## 2017-03-22 NOTE — PROGRESS NOTES
"Postpartum Progress Note  Julius Wilson  [id number]    Subjective:   Doing better today.  Still working on pain control and figuring out how to time oxycodone.  Still trying to avoid narcotics if possible.  No nausea or vomiting.  Up ambulating around and spending a lot of time in NICU.  Concerned she hasn't had another bowel movement after her first one.  Working on establishing milk supply for the twins and strongly desires to remain inpatient today for breastfeeding support.    Objective:  /77  Pulse 104  Temp 98.2  F (36.8  C) (Oral)  Resp 20  Ht 1.651 m (5' 5\")  LMP 2016  SpO2 100%  Breastfeeding? Unknown    General: NAD, alert, sitting comfortably  Heart: regular rate  Lungs: breathing comfortably  Abdomen: soft, appropriately tender to palpation, nondistended, fundus firm below U  Incision:  Pfannenstiel clean/dry/intact; covered with steristrips  Extremities: warm and well perfused, SCDs on, trace edema    Labs:  Hemoglobin   Date Value Ref Range Status   2017 8.9 (L) 11.7 - 15.7 g/dL Final   2017 10.3 (L) 11.7 - 15.7 g/dL Final       Assessment/Plan:  Julius Wilson is a 32 year old  who is POD#3 s/p PLTCS for mono-di twins with TTTS.  Currently stable and doing well.  Working on establishing breast milk supply for twins.  - Routine post-op cares  - Pain: s/p TAPs, continue tylenol/ibuprofen, roxicodone PRN.  - Bowel regimen for constipation prevention  - Heme: Hgb 10.3 > EBL 1000> 8.9, ABLA asymptomatic. Will DC home on iron supplementation.  - Rh positive, Rubella immune  - Babies: doing well in NICU, off CPAP  - Contraception: not discussed; patient had IUI w/ donor sperm this pregnancy  - Dispo: plan discharge to boarding today.    Dominique Cortes MD  OB/GYN Resident, PGY3  17 6:47 AM      [facility] staff:  Appreciate note by Dr. Cortes.  I have seen and examined the patient without the resident. I have reviewed, edited, and agree with the " note.        Bess Curry MD, FACOG  3/22/2017  3:57 PM

## 2017-03-22 NOTE — LACTATION NOTE
"This note was copied from a baby's chart.  D:  I met with Julius today.  She wanted to be sure she was doing everything \"right\".  I:  I went over what she has been doing, her pumping volumes are increasing nicely, she is getting nearly a full bottle each time.  She said that overnight she was given pain medication and decided to toss her next pumping out of caution.  I said I was sorry to hear that, told her that we would not give her something that would be a problem for her babies via milk.  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  I explained the differences between taking medications when pregnant and taking medications when lactating and told her we watch what she takes carefully and can give her information on how much (if any) medication enters milk and any effects (or lack of effects).  I suggested that if she is not sure in the future, she can always set a bottle aside with a note on the label and we can do this for her (before tossing it).  I observed her hand express.  She is logging, and I encouraged her to start adding amounts.  She made a hands free pumping bra, but the holes are so large they will not hold her flanges and I suggested alternative ways to do it.  Later, we met and I gave her a tour of the 11th floor room her babies will be moved to.  A:  Mom doing a great job, is very careful and is excited about being able to stay with her babies PRN.  P:  Will continue to provide lactation support.      Isabel Dodge, RNC, IBCLC   "

## 2017-03-23 NOTE — PLAN OF CARE
Problem: Goal Outcome Summary  Goal: Goal Outcome Summary  Outcome: Adequate for Discharge Date Met:  03/22/17  Pt. Received discharge instructions and denied having further questions than the ones answered. Take home meds given.  Pt. To discharge to boarding room.

## 2017-03-27 ENCOUNTER — OFFICE VISIT (OUTPATIENT)
Dept: MATERNAL FETAL MEDICINE | Facility: CLINIC | Age: 32
End: 2017-03-27
Attending: OBSTETRICS & GYNECOLOGY
Payer: COMMERCIAL

## 2017-03-27 ENCOUNTER — TELEPHONE (OUTPATIENT)
Dept: MATERNAL FETAL MEDICINE | Facility: CLINIC | Age: 32
End: 2017-03-27

## 2017-03-27 ENCOUNTER — APPOINTMENT (OUTPATIENT)
Dept: LAB | Facility: CLINIC | Age: 32
End: 2017-03-27
Attending: OBSTETRICS & GYNECOLOGY
Payer: COMMERCIAL

## 2017-03-27 VITALS — DIASTOLIC BLOOD PRESSURE: 70 MMHG | HEART RATE: 88 BPM | SYSTOLIC BLOOD PRESSURE: 108 MMHG

## 2017-03-27 DIAGNOSIS — Z98.891 STATUS POST CESAREAN DELIVERY: Primary | ICD-10-CM

## 2017-03-27 DIAGNOSIS — Z98.891 STATUS POST CESAREAN DELIVERY: ICD-10-CM

## 2017-03-27 LAB
ERYTHROCYTE [DISTWIDTH] IN BLOOD BY AUTOMATED COUNT: 12.8 % (ref 10–15)
HCT VFR BLD AUTO: 29.5 % (ref 35–47)
HGB BLD-MCNC: 9.5 G/DL (ref 11.7–15.7)
MCH RBC QN AUTO: 28.9 PG (ref 26.5–33)
MCHC RBC AUTO-ENTMCNC: 32.2 G/DL (ref 31.5–36.5)
MCV RBC AUTO: 90 FL (ref 78–100)
PLATELET # BLD AUTO: 361 10E9/L (ref 150–450)
RBC # BLD AUTO: 3.29 10E12/L (ref 3.8–5.2)
WBC # BLD AUTO: 11.1 10E9/L (ref 4–11)

## 2017-03-27 PROCEDURE — 99211 OFF/OP EST MAY X REQ PHY/QHP: CPT | Mod: 25,ZF

## 2017-03-27 PROCEDURE — 85027 COMPLETE CBC AUTOMATED: CPT | Performed by: OBSTETRICS & GYNECOLOGY

## 2017-03-27 PROCEDURE — 36415 COLL VENOUS BLD VENIPUNCTURE: CPT | Performed by: OBSTETRICS & GYNECOLOGY

## 2017-03-27 NOTE — MR AVS SNAPSHOT
"              After Visit Summary   3/27/2017    Julius Wilson    MRN: [id number]           Patient Information     Date Of Birth          1985        Visit Information        Provider Department      3/27/2017 11:30 AM Miller Perez MD [facility]        Today's Diagnoses     Status post  delivery           Follow-ups after your visit        Your next 10 appointments already scheduled     May 10, 2017  3:00 PM CDT   Ob Follow Up with UR EXAM RM 2   [facility] ([facility])    [address]  [facility] [place] [address]   [phone]              Who to contact     If you have questions or need follow up information about today's clinic visit or your schedule please contact [facility] directly at [phone].  Normal or non-critical lab and imaging results will be communicated to you by Critical Linkshart, letter or phone within 4 business days after the clinic has received the results. If you do not hear from us within 7 days, please contact the clinic through Critical Linkshart or phone. If you have a critical or abnormal lab result, we will notify you by phone as soon as possible.  Submit refill requests through Melty or call your pharmacy and they will forward the refill request to us. Please allow 3 business days for your refill to be completed.          Additional Information About Your Visit        Critical Linkshart Information     Melty lets you send messages to your doctor, view your test results, renew your prescriptions, schedule appointments and more. To sign up, go to [email or web address] . Click on \"Log in\" on the left side of the screen, which will take you to the Welcome page. Then click on \"Sign up Now\" on the right side of the page.     You will be asked to enter the access code listed below, as well as some personal information. Please follow the directions to create your " username and password.     Your access code is: [email or web address]  Expires: 2017  4:15 PM     Your access code will  in 90 days. If you need help or a new code, please call your [place] clinic or [phone].        Care EveryWhere ID     This is your Care EveryWhere ID. This could be used by other organizations to access your [place] medical records  [id number]        Your Vitals Were     Pulse Last Period                88 2016           Blood Pressure from Last 3 Encounters:   17 108/70   17 102/84   17 98/60    Weight from Last 3 Encounters:   03/15/17 88 kg (194 lb 1.6 oz)   17 89.4 kg (197 lb 1.6 oz)              We Performed the Following     CBC with Platelets     MFM Office Visit        Primary Care Provider    Physician No Ref-Primary       No address on file        Thank you!     Thank you for choosing [facility]  for your care. Our goal is always to provide you with excellent care. Hearing back from our patients is one way we can continue to improve our services. Please take a few minutes to complete the written survey that you may receive in the mail after your visit with us. Thank you!             Your Updated Medication List - Protect others around you: Learn how to safely use, store and throw away your medicines at www.disposemymeds.org.          This list is accurate as of: 3/27/17  7:48 PM.  Always use your most recent med list.                   Brand Name Dispense Instructions for use    acetaminophen 325 MG tablet    TYLENOL    40 tablet    Take 2 tablets (650 mg) by mouth every 4 hours as needed for other (surgical pain)       betamethasone acet & sod phos 6 (3-3) MG/ML Susp injection    CELESTONE    2 mL    Inject 2 mLs (12 mg) into the muscle once for 1 dose       calcium carbonate 500 MG chewable tablet    TUMS     Take 1 chew tab by mouth daily       calcium carbonate 500 MG tablet    OS-TIARRA 500 mg [place]. Ca     Take  500 mg by mouth 2 times daily       ferrous sulfate 325 (65 FE) MG tablet    IRON SUPPLEMENT    60 tablet    Take 1 tablet (325 mg) by mouth daily (with breakfast)       folic acid 1 MG tablet    FOLVITE         ibuprofen 400 MG tablet    ADVIL/MOTRIN    30 tablet    Take 1-2 tablets (400-800 mg) by mouth every 6 hours as needed for other (cramping)       oxyCODONE 5 MG IR tablet    ROXICODONE    15 tablet    Take 1-2 tablets (5-10 mg) by mouth every 4 hours as needed for moderate to severe pain       prenatal multivitamin  plus iron 27-0.8 MG Tabs per tablet      Take 1 tablet by mouth daily       senna-docusate 8.6-50 MG per tablet    SENOKOT-S;PERICOLACE    30 tablet    Take 1-2 tablets by mouth 2 times daily as needed for constipation       VITAMIN D (CHOLECALCIFEROL) PO      Take 2,000 Units by mouth daily

## 2017-03-27 NOTE — NURSING NOTE
"RN F:F 15 min  Julius here for PPV s/p C/S of mono/di twins 8 days ago.  Pt reports some incisional pain and steri strips still on dry and intact.  Pt reports headache off and on since Wed and occasionally dizziness.  Pt is pumping every 2 hours and gets teary when talking about this stating \"it is just too much\".  Explained to pt to talk with Lactation to see if she could go a longer stretch at night without pumping to sleep.  Dr. Perez in to see pt.  Pt also reporting some constipation issues.  Pt taking Colace, but not on a regular scheduled.  Encouraged pt to restart prenatal vitamins, but encourage to take Colace on a schedule.  PHQ-9 was 6 today and pt denies postpartum depression at this time, but isn't sleeping well at night and is having a difficult time taking a break from the babies.  Pt to lab for CBC with PLT.  Pt to be called with results.  Incision dry and intact and steri strips removed. Pt will let us know if she has any other concerns or questions and will try to get some sleep.  Geraldine Connors RN  "

## 2017-03-27 NOTE — TELEPHONE ENCOUNTER
reviewed labs as Julius called in and wanted to know her lab values. Pt to take PNV now with Folic Acid 400-800mg which should incliude  mg iron.  Pt states that her PNV also has 24 mg Iron. Once pt has regular BM's, she can take 325 mg Iron daily.  Pt also told to take colace 2 times daily as previously prescribed also. Geraldine Connors RN

## 2017-03-28 ASSESSMENT — PATIENT HEALTH QUESTIONNAIRE - PHQ9: SUM OF ALL RESPONSES TO PHQ QUESTIONS 1-9: 6

## 2017-03-28 NOTE — PROGRESS NOTES
S: Julius is overall feeling somewhat overwhelmed today.  Babies are doing well and are stable in NICU but she is struggling balancing everything.  Came in today concerned about swelling in her incision.      O: /70 (BP Location: Right arm, Patient Position: Chair, Cuff Size: Adult Large)  Pulse 88  LMP 2016    Gen: Pt AAOx3, NAD  Abd: Soft, NT, UFF at -5 U.  Incision C/D/I    PHQ-9 - score 4    A/P: Julius Wilson is a  s/p primary LTCS POD #7  Incision appears to be healing well without draining/swelling and fascia appears intact.  Julius with mild depressive symptoms today, but denies any SI or HI and overall is very appropriate and doing well.  She has a lot going on between pumping for twins in the NICU and balancing life with the babies still here in the hospital.  She will monitor her mood closely and recommend follow up in 1-2 weeks.      Miller Perez I spent a total of 10 minutes face-to-face with Julius Wilson during today's office visit.  Over 50% of this time was spent counseling the patient and/or coordinating care regarding postpartum visit for possible swelling around incision.  See note for details.    Miller Perez

## 2017-03-30 ENCOUNTER — TELEPHONE (OUTPATIENT)
Dept: MATERNAL FETAL MEDICINE | Facility: CLINIC | Age: 32
End: 2017-03-30

## 2017-03-30 NOTE — NURSING NOTE
Writer called and spoke with patient's  as Julius's phone broke.  Julius to call the PCC phone line on 3/31 and scheduled an appointment to see Dr. Hull on 4/4 for mood check and incision check and pHQ9 .  Pt is showing s/s of Postpartum depression and needs to be evaluated. Geraldine Connors RN

## 2017-03-30 NOTE — TELEPHONE ENCOUNTER
I received a call from Hillary Sandhu regarding Julius's mood.  Her PHQ-9 score was 4 in clinic earlier this week, but she is now feeling more symptomatic with post-partum depression.  She endorses feeling sad, but denies suicidal ideation or homicidal ideations.  We had discussed starting an SSRI if needed, and Julius now feels this is needed.  She has taken an SSRI in the past for a short time 6-7 years ago, which was possibly Celexa, but not certain.  Will start on Zoloft 50 mg po daily and follow up in clinic early next week.  Hillary will facilitate a referral to a counselor as well, which Julius is open to.  I will send this prescription to [place] pharmacy today.  Julius to report immediately to ED with any SI or HI and she will have her family monitor her mood as well.    Miller Perez

## 2017-04-05 ENCOUNTER — TELEPHONE (OUTPATIENT)
Dept: MATERNAL FETAL MEDICINE | Facility: CLINIC | Age: 32
End: 2017-04-05

## 2017-04-05 ENCOUNTER — TELEPHONE (OUTPATIENT)
Dept: OBGYN | Facility: CLINIC | Age: 32
End: 2017-04-05

## 2017-04-05 NOTE — TELEPHONE ENCOUNTER
"Julius calling, wondering if she needs to come in to [facility] for another check up. Julius states her mood is \"much better\" and she is not planning to start any medication for depression. She does complain of hot flashes, night sweats, chills, fatigue, stringy/pink vaginal discharge. She denies fever, bright red bleeding, soaking a pad/hour, incisional redness/drainage, increased pain. PCC discussed normal hormonal fluctuations following birth. Discussed when to call/come in. Julius will keep checking her temperature daily and call with any questions/concerns. PCC offered Julius a pp visit but Julius declined at this time.   "

## 2017-04-05 NOTE — TELEPHONE ENCOUNTER
"Julius calling, c/o increased tiredness/sleeping and feeling \"off\". Afebrile- temp 98.4 orally. Julius states she passed one \"golf-ball sized\" clot into toilet. States it appeared to be dark red. She is, otherwise, just having spotting. PCC instructed patient to call [facility] and ask to speak to a provider if she passes any more of these large clots or has increased vaginal bleeding/pain or fever. Julius was told to come to [facility] for a postpartum check tomorrow at 8:45am.   "

## 2017-04-06 NOTE — TELEPHONE ENCOUNTER
"Returned pt call regarding bleeding. Patient describes passing two golf ball sized clots this afternoon. Has not noted significant bleeding since, \"thin stringy, pink discharge\" otherwise. Has also been feeling quite fatigued. No fevers. No significant pain. No lightheadedness. Has an appt tomorrow morning. Discussed that she should keep her appt tomorrow but that if she develops fever, significant pain, or heavy bright red vaginal bleeding soaking through a pad an hour she should come in to the ED for evaluation. She stated understanding and agreed.    Marion Puga PGY2  4/5/2017 7:11 PM   "

## 2017-04-27 ENCOUNTER — OFFICE VISIT (OUTPATIENT)
Dept: MATERNAL FETAL MEDICINE | Facility: CLINIC | Age: 32
End: 2017-04-27
Attending: OBSTETRICS & GYNECOLOGY
Payer: COMMERCIAL

## 2017-04-27 VITALS
DIASTOLIC BLOOD PRESSURE: 63 MMHG | RESPIRATION RATE: 16 BRPM | HEART RATE: 77 BPM | WEIGHT: 174.8 LBS | BODY MASS INDEX: 29.09 KG/M2 | SYSTOLIC BLOOD PRESSURE: 104 MMHG

## 2017-04-27 DIAGNOSIS — O30.033 MONOCHORIONIC DIAMNIOTIC TWIN GESTATION IN THIRD TRIMESTER: ICD-10-CM

## 2017-04-27 NOTE — MR AVS SNAPSHOT
"              After Visit Summary   2017    Julius Wilson    MRN: [id number]           Patient Information     Date Of Birth          1985        Visit Information        Provider Department      2017 9:30 AM Pietro Bermeo MD [facility]        Today's Diagnoses     Monochorionic diamniotic twin gestation in third trimester           Follow-ups after your visit        Who to contact     If you have questions or need follow up information about today's clinic visit or your schedule please contact [facility]ET[facility] directly at [phone].  Normal or non-critical lab and imaging results will be communicated to you by .Club Domainshart, letter or phone within 4 business days after the clinic has received the results. If you do not hear from us within 7 days, please contact the clinic through [employer] or phone. If you have a critical or abnormal lab result, we will notify you by phone as soon as possible.  Submit refill requests through [email or web address] or call your pharmacy and they will forward the refill request to us. Please allow 3 business days for your refill to be completed.          Additional Information About Your Visit        MyChart Information     [email or web address] lets you send messages to your doctor, view your test results, renew your prescriptions, schedule appointments and more. To sign up, go to [email or web address] . Click on \"Log in\" on the left side of the screen, which will take you to the Welcome page. Then click on \"Sign up Now\" on the right side of the page.     You will be asked to enter the access code listed below, as well as some personal information. Please follow the directions to create your username and password.     Your access code is: [email or web address]  Expires: 2017  4:15 PM     Your access code will  in 90 days. If you need help or a new code, please call your [place] clinic or [phone].        Care EveryWhere ID     " This is your Care EveryWhere ID. This could be used by other organizations to access your [place] medical records  [id number]        Your Vitals Were     Pulse Respirations Last Period BMI (Body Mass Index)          77 16 08/12/2016 29.09 kg/m2         Blood Pressure from Last 3 Encounters:   04/27/17 104/63   03/27/17 108/70   03/22/17 102/84    Weight from Last 3 Encounters:   04/27/17 79.3 kg (174 lb 12.8 oz)   03/15/17 88 kg (194 lb 1.6 oz)   03/06/17 89.4 kg (197 lb 1.6 oz)              We Performed the Following     MFM Office Visit        Primary Care Provider    Physician No Ref-Primary       No address on file        Thank you!     Thank you for choosing [facility]  for your care. Our goal is always to provide you with excellent care. Hearing back from our patients is one way we can continue to improve our services. Please take a few minutes to complete the written survey that you may receive in the mail after your visit with us. Thank you!             Your Updated Medication List - Protect others around you: Learn how to safely use, store and throw away your medicines at www.disposemymeds.org.          This list is accurate as of: 4/27/17 11:28 AM.  Always use your most recent med list.                   Brand Name Dispense Instructions for use    acetaminophen 325 MG tablet    TYLENOL    40 tablet    Take 2 tablets (650 mg) by mouth every 4 hours as needed for other (surgical pain)       betamethasone acet & sod phos 6 (3-3) MG/ML Susp injection    CELESTONE    2 mL    Inject 2 mLs (12 mg) into the muscle once for 1 dose       calcium carbonate 500 MG chewable tablet    TUMS     Take 1 chew tab by mouth daily Reported on 4/27/2017       calcium carbonate 500 MG tablet    OS-TIARRA 500 mg [place]. Ca     Take 500 mg by mouth 2 times daily Reported on 4/27/2017       ferrous sulfate 325 (65 FE) MG tablet    IRON SUPPLEMENT    60 tablet    Take 1 tablet (325 mg) by mouth daily  (with breakfast)       folic acid 1 MG tablet    FOLVITE     Reported on 4/27/2017       ibuprofen 400 MG tablet    ADVIL/MOTRIN    30 tablet    Take 1-2 tablets (400-800 mg) by mouth every 6 hours as needed for other (cramping)       oxyCODONE 5 MG IR tablet    ROXICODONE    15 tablet    Take 1-2 tablets (5-10 mg) by mouth every 4 hours as needed for moderate to severe pain       prenatal multivitamin  plus iron 27-0.8 MG Tabs per tablet      Take 1 tablet by mouth daily       senna-docusate 8.6-50 MG per tablet    SENOKOT-S;PERICOLACE    30 tablet    Take 1-2 tablets by mouth 2 times daily as needed for constipation       sertraline 50 MG tablet    ZOLOFT    30 tablet    Take 1 tablet (50 mg) by mouth daily       VITAMIN D (CHOLECALCIFEROL) PO      Take 2,000 Units by mouth daily Reported on 4/27/2017

## 2017-04-27 NOTE — NURSING NOTE
Post Partum Visit -                 Delivery date: Julius Wilson is s/p LTCS at 31w2d of mono/di twins on 3/19/17        Patient's pregnancy was complicated by mono-di twins, twin twin tranfusion syndrome, symmetric IUGR in Fetus 2, and lack of growth in Fetus 1     Accompanying Signs & Symptoms:      Dr. Bermeo met with Julius.    VSS; denies calf tenderness, edema, HA, fever, or illness, denies urinary difficulties or leaking, no constipation, appetite good    Medication: talking tylenol or ibuprofen q 3 days for abdominal sorenss                       Breast feeding: patient is currently pumping and doing some breastfeeding, denies cracked or bleeding nipples   Abdominal pain: denies, abdomen soft and non tender                       Bleeding since delivery: minimal spotting, no spotting for 2-3 days now, not using pads or tampons         Contraception choice: no contraception; pt's  is infertile to due hx testicular cancer         Patient has not had intercourse since delivery    Exercise: walking 20-30 5x/week          Last PAP: patient reports last PAP June 2016 with infertility workup, states never having had an abnormal PAP.        Pt screened for postpartum depression and complaints are: NEGATIVE         PHQ9= 2    Julius has been instructed to return to [facility] for further care or questions.     Geraldine Tee RN  RN F:F 15 min

## 2017-04-27 NOTE — LETTER
4/27/2017  KatieJulius   1985      To Whom it May Concern:     Julius was seen by Dr. Bermeo on 4/27/17 for a postpartum visit following the premature birth of her twins on 3/19/17.  Julius has been cleared to return to work without restrictions at the end of her maternity leave.  If you have further questions please call [phone].          Sincerely,      Geraldine Tee RN  Patient Care Coordinator  Maternal Fetal Medicine

## 2017-04-27 NOTE — PROGRESS NOTES
"[facility] OB Visit    Subjective: She was seen 5 days after discharge due to some incision pain with adequate healing at that time and for some mild depressive symptoms without SI or HI. She subsequently called clinic 3/30 for more symptomatic post-partum depression and reported having been on a SSRI previously 6-7 years ago so 50mg Zoloft was started. At that time she was instructed to f/u in clinic in 1 week which she did not. On 4/5 she reported feeling \"much better\" and was not planning on starting the medication at that time. At that time she reported passing three \"golf-ball sized\" clots and was instructed to go into clinic the next day, which she did not.    Her mood is good today. She reports no need for contraception due to her  having history of testicular cancer and had an orchiectomy. She reports breast feeding has been going well. No additional bleeding issues. She will have the rest of her f/u including her 6 week PP with her doctors at [facility].    Objective:  /63 (BP Location: Left arm, Patient Position: Chair)  Pulse 77  Resp 16  Wt 79.3 kg (174 lb 12.8 oz)  LMP 08/12/2016  BMI 29.09 kg/m2  Gen: Well appearing, NAD, sitting in chair  Chest: No increased work of breathing on room air  Abd: Soft, fundus below umbilicus, nontender, incision C/D/I  Ext: trace edema, warm, well perfused    Assessment and Plan: Julius Wilsno is a 33 yo who is now a  s/p LTCS on 3/19/17 for mono-di twins with TTTS, symmetric IUGR in fetus 2, and lack of growth in fetus 1.     1) PP Depressive symptoms  - mood good today, no further concerns    2) Post partum check in  - No further bleeding  - Breast feeding going well  - BC:  had orchiectomy  - Remainder of f/u with [facility] including 6w PP check up  - Patient can reinitiate all of her activities.    Scribe Disclosure:   Penny BUSH MS3, am serving as a scribe; to document services personally performed by Dr. Bermeo- -based " on data collection and the provider's statements to me.     Provider Disclosure:  I agree with above History, Review of Systems, Physical exam and Plan.  I have reviewed the content of the documentation and have edited it as needed. I have personally performed the services documented here and the documentation accurately represents those services and the decisions I have made.      Electronically signed by:  Pietro Rebollar M.D.    Physician Attestation   I, Pietro Rebollar, saw and evaluated Julius Wilson with the resident.      I have reviewed and discussed with Penny Bronson the patient history, physical exam and plan of care. I personally reviewed the vital signs, medications, lab results and imaging.    Key history or physical exam findings: normal postpartum course.    Key management decisions made: return to care with PCP.    Pietro Rebollar  Date of Service (when I saw the patient): 04/27/17    Time Spent on this Encounter   I, Pierto Rebollar, spent a total of 15 minutes in face to face consultation today managing the care of Julius Wilson.  Over 50% of my time on the unit was spent counseling the patient and /or coordinating care regarding postpartum care.. See note for details.

## 2017-04-28 ASSESSMENT — PATIENT HEALTH QUESTIONNAIRE - PHQ9: SUM OF ALL RESPONSES TO PHQ QUESTIONS 1-9: 2

## 2017-05-27 ENCOUNTER — TELEPHONE (OUTPATIENT)
Dept: PEDIATRICS | Facility: CLINIC | Age: 32
End: 2017-05-27

## 2017-05-27 DIAGNOSIS — Z20.820 EXPOSURE TO VARICELLA ZOSTER VIRUS (VZV): Primary | ICD-10-CM

## 2017-05-27 RX ORDER — VALACYCLOVIR HYDROCHLORIDE 1 G/1
1000 TABLET, FILM COATED ORAL 3 TIMES DAILY
Qty: 21 TABLET | Refills: 0 | Status: SHIPPED | OUTPATIENT
Start: 2017-05-27 | End: 2021-11-29

## 2017-11-28 ENCOUNTER — TELEPHONE (OUTPATIENT)
Dept: MATERNAL FETAL MEDICINE | Facility: CLINIC | Age: 32
End: 2017-11-28

## 2017-11-28 NOTE — TELEPHONE ENCOUNTER
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  Pt requested to see Dr. Perez only for consult. Dr. Perez was consulted regarding pt request.  Pt needs to contact her primary OB to get an order to have a MFM consult to be seen.  Pt is aware of this and stated that she will contact her primary OB. Pt wanted to be scheduled for 12/21 when Dr. Perez was here so she was tentatively scheduled for 12/21 at 11:45 pending order for a consult from primary.  Geraldine Connors RN

## 2018-08-16 ENCOUNTER — TELEPHONE (OUTPATIENT)
Dept: MATERNAL FETAL MEDICINE | Facility: CLINIC | Age: 33
End: 2018-08-16

## 2018-08-16 NOTE — TELEPHONE ENCOUNTER
Julius calling asking if she could fax paperwork for surrogacy clearance to [facility] without being seen for an appointment.  Writer replied patient does need to be seen for face to face visit after referral received. Julius verbalized understanding.    Geraldine Tee RN

## 2018-08-17 DIAGNOSIS — Z31.7 ENCOUNTER FOR PROCREATIVE MANAGEMENT AND COUNSELING FOR PERSON ACTING AS GESTATIONAL SURROGATE: Primary | ICD-10-CM

## 2018-09-17 ENCOUNTER — PRE VISIT (OUTPATIENT)
Dept: MATERNAL FETAL MEDICINE | Facility: CLINIC | Age: 33
End: 2018-09-17

## 2018-09-20 ENCOUNTER — OFFICE VISIT (OUTPATIENT)
Dept: MATERNAL FETAL MEDICINE | Facility: CLINIC | Age: 33
End: 2018-09-20
Attending: OBSTETRICS & GYNECOLOGY
Payer: COMMERCIAL

## 2018-09-20 VITALS — DIASTOLIC BLOOD PRESSURE: 65 MMHG | SYSTOLIC BLOOD PRESSURE: 113 MMHG

## 2018-09-20 DIAGNOSIS — Z31.7 ENCOUNTER FOR PROCREATIVE MANAGEMENT AND COUNSELING FOR PERSON ACTING AS GESTATIONAL SURROGATE: ICD-10-CM

## 2018-09-20 NOTE — MR AVS SNAPSHOT
"              After Visit Summary   2018    Julius Wilson    MRN: [id number]           Patient Information     Date Of Birth          1985        Visit Information        Provider Department      2018 2:15 PM Miller Perez MD [facility] Maternal Fetal [facility]        Today's Diagnoses     Encounter for procreative management and counseling for person acting as gestational surrogate           Follow-ups after your visit        Who to contact     If you have questions or need follow up information about today's clinic visit or your schedule please contact [facility] directly at [phone].  Normal or non-critical lab and imaging results will be communicated to you by [employer], letter or phone within 4 business days after the clinic has received the results. If you do not hear from us within 7 days, please contact the clinic through [employer] or phone. If you have a critical or abnormal lab result, we will notify you by phone as soon as possible.  Submit refill requests through [employer] or call your pharmacy and they will forward the refill request to us. Please allow 3 business days for your refill to be completed.          Additional Information About Your Visit        MyChart Information     [employer] lets you send messages to your doctor, view your test results, renew your prescriptions, schedule appointments and more. To sign up, go to [email or web address] . Click on \"Log in\" on the left side of the screen, which will take you to the Welcome page. Then click on \"Sign up Now\" on the right side of the page.     You will be asked to enter the access code listed below, as well as some personal information. Please follow the directions to create your username and password.     Your access code is: [email or web address]  Expires: 2018  6:43 PM     Your access code will  in 90 days. If you need help or a new code, please call your [place] clinic or " [phone].        Care EveryWhere ID     This is your Care EveryWhere ID. This could be used by other organizations to access your [place] medical records  [id number]         Blood Pressure from Last 3 Encounters:   18 113/65   17 104/63   17 108/70    Weight from Last 3 Encounters:   17 79.3 kg (174 lb 12.8 oz)   03/15/17 88 kg (194 lb 1.6 oz)   17 89.4 kg (197 lb 1.6 oz)              We Performed the Following     MFM Office Visit        Primary Care Provider Fax #    Physician No Ref-Primary [phone]       No address on file        Equal Access to Services     MICHA JONAS : Jamie Gonzalez, brayden ryder, loc mcgowan, john lockhart . [facility] [phone].    ATENCIÓN: Si habla español, tiene a gee disposición servicios gratuitos de asistencia lingüística. Llame al [phone].    We comply with applicable federal civil rights laws and [place] laws. We do not discriminate on the basis of race, color, national origin, age, disability, sex, sexual orientation, or gender identity.            Thank you!     Thank you for choosing [facility]  for your care. Our goal is always to provide you with excellent care. Hearing back from our patients is one way we can continue to improve our services. Please take a few minutes to complete the written survey that you may receive in the mail after your visit with us. Thank you!             Your Updated Medication List - Protect others around you: Learn how to safely use, store and throw away your medicines at www.disposemymeds.org.          This list is accurate as of 18 11:59 PM.  Always use your most recent med list.                   Brand Name Dispense Instructions for use Diagnosis    acetaminophen 325 MG tablet    TYLENOL    40 tablet    Take 2 tablets (650 mg) by mouth every 4 hours as needed for other (surgical pain)    S/P   section       betamethasone acet & sod phos 6 (3-3) MG/ML Susp injection    CELESTONE    2 mL    Inject 2 mLs (12 mg) into the muscle once for 1 dose    Monochorionic diamniotic twin pregnancy, Twin to twin transfusion, third trimester       calcium carbonate 500 MG chewable tablet    TUMS     Take 1 chew tab by mouth daily Reported on 2017        calcium carbonate 500 mg {elemental} 500 MG tablet    OS-TIARRA     Take 500 mg by mouth 2 times daily Reported on 2017        ferrous sulfate 325 (65 Fe) MG tablet    IRON SUPPLEMENT    60 tablet    Take 1 tablet (325 mg) by mouth daily (with breakfast)    Anemia due to blood loss, acute       folic acid 1 MG tablet    FOLVITE     Reported on 2017        ibuprofen 400 MG tablet    ADVIL/MOTRIN    30 tablet    Take 1-2 tablets (400-800 mg) by mouth every 6 hours as needed for other (cramping)    S/P  section       oxyCODONE IR 5 MG tablet    ROXICODONE    15 tablet    Take 1-2 tablets (5-10 mg) by mouth every 4 hours as needed for moderate to severe pain    S/P  section       prenatal multivitamin plus iron 27-0.8 MG Tabs per tablet      Take 1 tablet by mouth daily        senna-docusate 8.6-50 MG per tablet    SENOKOT-S;PERICOLACE    30 tablet    Take 1-2 tablets by mouth 2 times daily as needed for constipation    S/P  section       sertraline 50 MG tablet    ZOLOFT    30 tablet    Take 1 tablet (50 mg) by mouth daily    Post partum depression       valACYclovir 1000 mg tablet    VALTREX    21 tablet    Take 1 tablet (1,000 mg) by mouth 3 times daily    Exposure to varicella zoster virus (VZV)       VITAMIN D (CHOLECALCIFEROL) PO      Take 2,000 Units by mouth daily Reported on 2017

## 2018-09-20 NOTE — PROGRESS NOTES
Maternal-Fetal Medicine Consultation    Julius Yi  : 1985  MRN: [id number]    REFERRAL:  Julius Yi is a 33 year old   who presents for completion of surrogacy paperwork.     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 During her pregnancy she had one amnio-reduction, one course of BMZ, and a rescue course of BMZ. She was ultimately delivered by LTCS at 31w2d due to sIUGR. Her pregnancy was the result of IUI with donor sperm. She presents today for completion of surrogate paperwork. She strongly desires to serve as a surrogate and presents today so we can complete paperwork for her to be considered as a candidate. She does not plan to have additional biological children as her  does not desire to grow their family. Her twin boys are nearly 18 months old, healthy, and doing well.     She is up to date on her Pap. Denies signs or symptoms of STI or vaginitis, CP, SOB, fever, chills, or N/V/D.    Obstetrics History:  Obstetric History       T0      L2     SAB0   TAB0   Ectopic0   Multiple1   Live Births2       # Outcome Date GA Lbr Esdras/2nd Weight Sex Delivery Anes PTL Lv   1A  17 31w2d  1.7 kg (3 lb 12 oz) M CS-LTranv   ZACH      Name: VIVIENNE YI      Apgar1:  9                Apgar5: 9   1B  17 31w2d  1.37 kg (3 lb 0.3 oz) M    ZACH      Name: KALINA YI      Apgar1:  8                Apgar5: 9          Gynecologic History:  - Last Pap: 2016  - Denies any history of abnormal pap smears  - Denies prior cervical surgery or procedures  - Denies any history of frequent UTIs, vaginal infections, or STIs    Past Medical History:  No past medical history on file.    Past Surgical History:  Past Surgical History:   Procedure Laterality Date      SECTION N/A 3/19/2017    Procedure:   SECTION;  Surgeon: Moon Guerrero MD;  Location:  L+D       Current Medications:    Prior to Admission medications    Medication Sig Last Dose Taking? Auth Provider   acetaminophen (TYLENOL) 325 MG tablet Take 2 tablets (650 mg) by mouth every 4 hours as needed for other (surgical pain) Taking  Moon Guerrero MD   betamethasone acet & sod phos (CELESTONE) 6 (3-3) MG/ML SUSP injection Inject 2 mLs (12 mg) into the muscle once for 1 dose Taking  Miller Perez MD   calcium carbonate (OS-TIARRA 500 MG [place]. [place]) 500 MG tablet Take 500 mg by mouth 2 times daily Reported on 2017 Not Taking  Reported, Patient   calcium carbonate (TUMS) 500 MG chewable tablet Take 1 chew tab by mouth daily Reported on 2017 Not Taking  Reported, Patient   ferrous sulfate (IRON SUPPLEMENT) 325 (65 FE) MG tablet Take 1 tablet (325 mg) by mouth daily (with breakfast) Taking  Moon Guerrero MD   folic acid (FOLVITE) 1 MG tablet Reported on 2017 Not Taking  Reported, Patient   ibuprofen (ADVIL/MOTRIN) 400 MG tablet Take 1-2 tablets (400-800 mg) by mouth every 6 hours as needed for other (cramping) Taking  Moon Guerrero MD   oxyCODONE (ROXICODONE) 5 MG IR tablet Take 1-2 tablets (5-10 mg) by mouth every 4 hours as needed for moderate to severe pain  Patient not taking: Reported on 2017 Not Taking  Moon Guerrero MD   Prenatal Vit-Fe Fumarate-FA (PRENATAL MULTIVITAMIN  PLUS IRON) 27-0.8 MG TABS per tablet Take 1 tablet by mouth daily Taking  Reported, Patient   senna-docusate (SENOKOT-S;PERICOLACE) 8.6-50 MG per tablet Take 1-2 tablets by mouth 2 times daily as needed for constipation Taking  Moon Guerrero MD   sertraline (ZOLOFT) 50 MG tablet Take 1 tablet (50 mg) by mouth daily  Patient not taking: Reported on 2017 Not Taking  Miller Perez MD   valACYclovir (VALTREX) 1000 mg tablet Take 1 tablet (1,000 mg) by mouth 3 times  daily   Cha Alvarez MD   VITAMIN D, CHOLECALCIFEROL, PO Take 2,000 Units by mouth daily Reported on 2017 Not Taking  Reported, Patient       Drug and lactation database from the [place] National Library of Medicine:  http://toxnet.nlm.nih.gov/cgi-bin/sis/htmlgen?LACT    Medications Prior to Pregnancy:      Allergies:  Sulfamethoxazole-trimethoprim    Social History:   Social History     Social History     Marital status:      Spouse name: N/A     Number of children: N/A     Years of education: N/A     Occupational History     Not on file.     Social History Main Topics     Smoking status: Never Smoker     Smokeless tobacco: Not on file     Alcohol use No     Drug use: No     Sexual activity: Not Currently     Other Topics Concern     Not on file     Social History Narrative       Family History:  No family history on file.    Partner History:      ROS:  10-point ROS negative except as in HPI     PHYSICAL EXAM:  /65 (BP Location: Right arm, Patient Position: Sitting, Cuff Size: Adult Regular)    Gen: NAD, well appearing  Chest: Non-labored breathing     ASSESSMENT:  33 year old   who presents for completion of surrogacy paperwork.   - Desires to serve as a surrogate    RECOMMENDATIONS:    #Desires to serve as a surrogate  -Patient has history of one prior pregnancy complicated by mono/di twin pregnancy with TTTS resulting in  delivery by PLTCS.  -Julius does not meet exclusion criteria for being a surrogate, but her eligibility will be determined by the surrogacy agency.  Of note, she has not had a full term birth, but her  birth was iatrogenic and related to her monochorionic twin gestation.  -Recommend planning for single embryo transfer with goal of gsutafson pregnancy to optimize maternal and fetal health:   - Multiple gestation is associated with higher rates of almost every potential complication of pregnancy, with the exceptions of post-term pregnancy  and macrosomia. Twin pregnancies are associated with higher rates of hypertensive disorders of pregnancy, gestational diabetes, fetal growth restriction and/or discordant fetal growth, fetal malpresentation, postpartum hemorrhage, and  labor (PTL). The most serious risk is that of spontaneous  delivery (PTD), which plays a major role in the increased  mortality and short-term and long-term morbidity observed in these infants.  Higher rates of intrauterine growth restriction (IUGR), congenital anomalies, and cerebral palsy also contribute to adverse outcome in twin births. More than 50% of twin gestations are likely to be delivered via . Increased maternal risks may result in prolonged hospitalization in the antepartum period. Prolonged hospitalization may result in hardships such as inability to work, separation from nuclear family, inability to serve as a caregiver for existing children, and overall risk to maternal health.   -Patient demonstrates understanding regarding recommendation for gustafson pregnancy.     Alicia Myhre, DO, MS  Obstetrics and Gyncology, PGY-2  2018 , 6:35 PM      Physician Attestation   I, Miller Perez, saw this patient and agree with the findings and plan of care as documented in the note.      Items personally reviewed/procedural attestation: labs.    Miller Perez MD    I spent a total of 15 minutes face-to-face with Julius Wilson during today's office visit.  Over 50% of this time was spent counseling the patient and/or coordinating care regarding preconception planning for possible surrogate pregnancy.  See note for details.    Miller Perez

## 2018-09-20 NOTE — PROGRESS NOTES
Pt presents to [facility] for preconception consult due to need for surrogacy paperwork filled out. Pt met with Dr. Myhre and Dr. Perez. See note in epic for today's consult discussion and recommendations. BP stable today. Questions answered. Discharged stable without follow up at this time. Fabiana Thurman RN

## 2019-01-31 NOTE — PROGRESS NOTES
"S: Julius is overall feeling well today. She denies any LOF, VB or contractions.  Both babies active.    O: /71 (BP Location: Right arm, Patient Position: Chair)  Pulse 89  Resp 18  Wt 88 kg (194 lb 1.6 oz)  LMP 2016  BMI 32.3 kg/m2    Gen: Pt AAOx3, NAD  Abd: Gravid, soft, NT.  Site of amnioreduction well healed without surrounding erythema.    Please see \"Imaging\" tab under \"Chart Review\" for details of today's US.    A/P: Julius Wilson is a  at 30w5d with pregnancy complicated by monochorionic diamniotic twin gestation with known TTTS Benitez stage I s/p amnioreduction on 3/3/17 and sIUGR twin B.  Continue 3x/wk follow up and if stable can consider decreasing to 2x/wk  Delivery recommended at 32-34 weeks gestation, depending on clinical circumstances and US results.  GBS next week.  FREDI visit 1 week.    Miller Perez I spent a total of 15 minutes face-to-face with Julius Wilson during today's office visit.  Over 50% of this time was spent counseling the patient and/or coordinating care regarding pregnancy complicated by mo-di twin with TTTS (Benitez stage 1 and sIUGR twin B).  See note for details.    Miller Perez      "
None

## 2019-10-09 ENCOUNTER — OFFICE VISIT - HEALTHEAST (OUTPATIENT)
Dept: FAMILY MEDICINE | Facility: CLINIC | Age: 34
End: 2019-10-09

## 2019-10-09 DIAGNOSIS — J06.9 VIRAL URI WITH COUGH: ICD-10-CM

## 2020-02-17 ENCOUNTER — OFFICE VISIT - HEALTHEAST (OUTPATIENT)
Dept: FAMILY MEDICINE | Facility: CLINIC | Age: 35
End: 2020-02-17

## 2020-02-17 DIAGNOSIS — R07.0 THROAT PAIN: ICD-10-CM

## 2020-02-17 DIAGNOSIS — J10.1 INFLUENZA A: ICD-10-CM

## 2020-02-17 DIAGNOSIS — R50.9 FEVER: ICD-10-CM

## 2020-02-17 LAB
DEPRECATED S PYO AG THROAT QL EIA: NORMAL
FLUAV AG SPEC QL IA: ABNORMAL
FLUBV AG SPEC QL IA: ABNORMAL

## 2020-02-17 ASSESSMENT — MIFFLIN-ST. JEOR: SCORE: 1485.63

## 2020-02-18 LAB — GROUP A STREP BY PCR: NORMAL

## 2020-02-24 ENCOUNTER — COMMUNICATION - HEALTHEAST (OUTPATIENT)
Dept: SCHEDULING | Facility: CLINIC | Age: 35
End: 2020-02-24

## 2020-12-16 ENCOUNTER — OFFICE VISIT - HEALTHEAST (OUTPATIENT)
Dept: FAMILY MEDICINE | Facility: CLINIC | Age: 35
End: 2020-12-16

## 2020-12-16 DIAGNOSIS — Z20.822 EXPOSURE TO COVID-19 VIRUS: ICD-10-CM

## 2020-12-16 DIAGNOSIS — R30.0 DYSURIA: ICD-10-CM

## 2020-12-16 LAB
ALBUMIN UR-MCNC: NEGATIVE MG/DL
APPEARANCE UR: CLEAR
BILIRUB UR QL STRIP: NEGATIVE
COLOR UR AUTO: YELLOW
GLUCOSE UR STRIP-MCNC: NEGATIVE MG/DL
HGB UR QL STRIP: NEGATIVE
KETONES UR STRIP-MCNC: NEGATIVE MG/DL
LEUKOCYTE ESTERASE UR QL STRIP: NEGATIVE
NITRATE UR QL: NEGATIVE
PH UR STRIP: 6.5 [PH] (ref 5–8)
SP GR UR STRIP: 1.02 (ref 1–1.03)
UROBILINOGEN UR STRIP-ACNC: NORMAL

## 2020-12-17 ENCOUNTER — COMMUNICATION - HEALTHEAST (OUTPATIENT)
Dept: SCHEDULING | Facility: CLINIC | Age: 35
End: 2020-12-17

## 2021-01-15 ENCOUNTER — HEALTH MAINTENANCE LETTER (OUTPATIENT)
Age: 36
End: 2021-01-15

## 2021-03-16 ENCOUNTER — TRANSFERRED RECORDS (OUTPATIENT)
Dept: MULTI SPECIALTY CLINIC | Facility: CLINIC | Age: 36
End: 2021-03-16
Payer: COMMERCIAL

## 2021-03-16 LAB
HPV ABSTRACT: NORMAL
PAP SMEAR - HIM PATIENT REPORTED: NORMAL

## 2021-04-06 ENCOUNTER — OFFICE VISIT - HEALTHEAST (OUTPATIENT)
Dept: FAMILY MEDICINE | Facility: CLINIC | Age: 36
End: 2021-04-06

## 2021-04-06 DIAGNOSIS — Z72.0 TOBACCO USE: ICD-10-CM

## 2021-04-06 DIAGNOSIS — J01.01 ACUTE RECURRENT MAXILLARY SINUSITIS: ICD-10-CM

## 2021-04-06 ASSESSMENT — MIFFLIN-ST. JEOR: SCORE: 1484.21

## 2021-04-18 ENCOUNTER — OFFICE VISIT - HEALTHEAST (OUTPATIENT)
Dept: FAMILY MEDICINE | Facility: CLINIC | Age: 36
End: 2021-04-18

## 2021-04-18 DIAGNOSIS — R06.02 SHORTNESS OF BREATH: ICD-10-CM

## 2021-04-19 ENCOUNTER — COMMUNICATION - HEALTHEAST (OUTPATIENT)
Dept: ADMINISTRATIVE | Facility: CLINIC | Age: 36
End: 2021-04-19

## 2021-04-19 DIAGNOSIS — J01.90 ACUTE SINUSITIS TREATED WITH ANTIBIOTICS IN THE PAST 60 DAYS: ICD-10-CM

## 2021-04-21 ENCOUNTER — COMMUNICATION - HEALTHEAST (OUTPATIENT)
Dept: SCHEDULING | Facility: CLINIC | Age: 36
End: 2021-04-21

## 2021-04-21 ENCOUNTER — NURSE TRIAGE (OUTPATIENT)
Dept: NURSING | Facility: CLINIC | Age: 36
End: 2021-04-21

## 2021-04-21 DIAGNOSIS — R09.81 SINUS CONGESTION: ICD-10-CM

## 2021-04-21 DIAGNOSIS — R43.2 LOSS OF TASTE: ICD-10-CM

## 2021-04-21 NOTE — TELEPHONE ENCOUNTER
Sinus infection 1-2 weeks ago, then after antibiotic began feeling ill again. She now cannot taste her food for the last 24 hrs.   SX began about 4 days ago.    is requesting a COVID test for his wife.   See other encounter in HE Epic.     Rachel Rogers RN Triage Nurse Advisor 3:42 PM 4/21/2021    Reason for Disposition    [1] COVID-19 infection suspected by caller or triager AND [2] mild symptoms (cough, fever, or others) AND [3] no complications or SOB    Additional Information    Negative: SEVERE difficulty breathing (e.g., struggling for each breath, speaks in single words)    Negative: Difficult to awaken or acting confused (e.g., disoriented, slurred speech)    Negative: Bluish (or gray) lips or face now    Negative: Shock suspected (e.g., cold/pale/clammy skin, too weak to stand, low BP, rapid pulse)    Negative: Sounds like a life-threatening emergency to the triager    Negative: [1] COVID-19 exposure AND [2] no symptoms    Negative: [1] Lives with someone known to have influenza (flu test positive) AND [2] flu-like symptoms (e.g., cough, runny nose, sore throat, SOB; with or without fever)    Negative: [1] Adult with possible COVID-19 symptoms AND [2] triager concerned about severity of symptoms or other causes    Negative: COVID-19 vaccine reaction suspected (e.g., fever, headache, muscle aches) occurring during days 1-3 after getting vaccine    Negative: COVID-19 vaccine, questions about    Negative: COVID-19 and breastfeeding, questions about    Negative: SEVERE or constant chest pain or pressure (Exception: mild central chest pain, present only when coughing)    Negative: MODERATE difficulty breathing (e.g., speaks in phrases, SOB even at rest, pulse 100-120)    Negative: [1] Headache AND [2] stiff neck (can't touch chin to chest)    Negative: [1] HIGH RISK patient (e.g., age > 64 years, diabetes, heart or lung disease, weak immune system) AND [2] new or worsening symptoms    Negative: [1] HIGH  RISK patient AND [2] influenza is widespread in the community AND [3] ONE OR MORE respiratory symptoms: cough, sore throat, runny or stuffy nose    Negative: [1] HIGH RISK patient AND [2] influenza exposure within the last 7 days AND [3] ONE OR MORE respiratory symptoms: cough, sore throat, runny or stuffy nose    Negative: Fever > 103 F (39.4 C)    Negative: [1] Fever > 101 F (38.3 C) AND [2] age > 60    Negative: [1] Fever > 100.0 F (37.8 C) AND [2] bedridden (e.g., nursing home patient, CVA, chronic illness, recovering from surgery)    Negative: MILD difficulty breathing (e.g., minimal/no SOB at rest, SOB with walking, pulse <100)    Negative: Chest pain or pressure    Negative: Patient sounds very sick or weak to the triager    Negative: Fever present > 3 days (72 hours)    Negative: [1] Fever returns after gone for over 24 hours AND [2] symptoms worse or not improved    Negative: [1] Continuous (nonstop) coughing interferes with work or school AND [2] no improvement using cough treatment per protocol    Protocols used: CORONAVIRUS (COVID-19) DIAGNOSED OR [id number] 1.3

## 2021-04-22 ENCOUNTER — COMMUNICATION - HEALTHEAST (OUTPATIENT)
Dept: SCHEDULING | Facility: CLINIC | Age: 36
End: 2021-04-22

## 2021-04-26 ENCOUNTER — COMMUNICATION - HEALTHEAST (OUTPATIENT)
Dept: SCHEDULING | Facility: CLINIC | Age: 36
End: 2021-04-26

## 2021-06-02 NOTE — PATIENT INSTRUCTIONS - HE
You can take normal doses of tylenol for your headache.     You can start using the nasal spray (Atrovent) up to 3 times a day for the nasal congestion - should also help the sore throat after using for about a day (the sore throat is most likely from postnasal drip)    If you feel any decreased fetal movement, please go in to be evaluated by your OB clinic or the hospital you plan to deliver at.

## 2021-06-02 NOTE — PROGRESS NOTES
Assessment/Plan:         Julius was seen today for cough and sore throat.    Diagnoses and all orders for this visit:    Viral URI with cough: symptoms and exam consistent with viral URI (children with the same, no fever or other infectious signs). She has good fetal movement. Will treat with tylenol and atrovent nasal spray for symptom relief. She has appointment with her OB tomorrow as well. Continue to monitor for worsening symptoms. Discussed concerning symptoms for which to return.  -     ipratropium (ATROVENT) 0.03 % nasal spray; 2 sprays into each nostril 3 (three) times a day.            Plan of care was discussed with the patient and/or guardian. They verbalize understanding of the treatment options and plan of care.    Mena Noland       Subjective:        Julius Wilson is a 34 y.o. female who presents for cough, rhinorrhea, sore throat and general misery while 30wks pregnant.  Symptoms started on Friday (5d ago).  Rhinorrhea, cough were initial symptoms. Has two small sons who also have a cold.   Sore throat and a sensation like there is a 'bubble' in her throat that needs to be coughed out all the time and a headache have all started in the last 3 days. She coughs so hard that she urinates a little bit.   She is using a pregnancy-safe mucinex (guanfacine it sounds like) and occasional throat lozenges (also pregnancy safe labeled). Not taking any tylenol.     No fever, chills. She does not feel short of breath, light-headed, nauseated. No diarrhea or constipation. She is feeling her baby move normally (actively kicking right now).       At baseline she is healthy, takes no medications routinely except pregnancy related multivitamin/iron  Allergy to bactrim.         Objective:       Blood pressure 98/61, pulse 100, temperature 97.7  F (36.5  C), temperature source Oral, resp. rate 18, weight 188 lb 8 oz (85.5 kg), SpO2 98 %, not currently breastfeeding.   Gen: well appearing, mildly tired  appearing, but no distress.  Card: RRR, no murmur, normal S1/S2. No pedal edema.  Resp: clear to auscultation bilaterally, no wheeze or crackles.   Abdomen: pregnant, active fetal movement visible, soft, nontender, not distended, no guarding/rebound, normal bowel sounds.  HEENT: Head - normocephalic, atraumatic   Eyes - normal lids and conjuntivae, EOMs intact   Nose - no deformity, without masses, moderate thick yellow rhinorrhea  Oropharynx - Cobblestoned appearance of the posterior pharynx. Oral mucosa normal, moist mucous membranes

## 2021-06-03 VITALS
SYSTOLIC BLOOD PRESSURE: 98 MMHG | DIASTOLIC BLOOD PRESSURE: 61 MMHG | OXYGEN SATURATION: 98 % | HEART RATE: 100 BPM | RESPIRATION RATE: 18 BRPM | WEIGHT: 188.5 LBS | TEMPERATURE: 97.7 F | BODY MASS INDEX: 31.37 KG/M2

## 2021-06-04 VITALS
OXYGEN SATURATION: 95 % | BODY MASS INDEX: 29.37 KG/M2 | HEIGHT: 65 IN | RESPIRATION RATE: 16 BRPM | DIASTOLIC BLOOD PRESSURE: 70 MMHG | WEIGHT: 176.31 LBS | HEART RATE: 135 BPM | SYSTOLIC BLOOD PRESSURE: 108 MMHG | TEMPERATURE: 101.3 F

## 2021-06-05 VITALS
BODY MASS INDEX: 29.32 KG/M2 | HEART RATE: 68 BPM | HEIGHT: 65 IN | SYSTOLIC BLOOD PRESSURE: 100 MMHG | DIASTOLIC BLOOD PRESSURE: 40 MMHG | WEIGHT: 176 LBS | OXYGEN SATURATION: 99 % | TEMPERATURE: 98.2 F

## 2021-06-05 VITALS
HEART RATE: 76 BPM | RESPIRATION RATE: 16 BRPM | WEIGHT: 169.6 LBS | SYSTOLIC BLOOD PRESSURE: 98 MMHG | OXYGEN SATURATION: 95 % | DIASTOLIC BLOOD PRESSURE: 64 MMHG | BODY MASS INDEX: 28.22 KG/M2 | TEMPERATURE: 98.1 F

## 2021-06-06 NOTE — TELEPHONE ENCOUNTER
Triage call: NO PCP   Seen last Monday - lab for influenza A positive 2/174/2020- symptoms started 2/16/2020  States that she took tamiflu as prescribed  Fever is gone - slight improvements   NO wheezing or trouble breathing- feels like she gets winded with minimal exertion   Lots of mucous production- coughs so hard that she gags  body aches are improving   Sinus drainage- lots of green drainage- sinus pressure is relieved slightly    She reports there is congestion in her ears- not painful     Triaged to continue home care at this time with advice to follow up in [facility] if no improvement with care advice. Reviewed additional care advice with patient and she verbalizes understanding.     Karime Medellin RN BSBA Care Connection Triage/Med Refill 2/24/2020 7:33 AM    Reason for Disposition    Influenza (diagnosed by HCP) and no complications    Protocols used: INFLUENZA FOLLOW-UP CALL-A-OH

## 2021-06-06 NOTE — PROGRESS NOTES
[facility] Note                                                        Date of Visit: 2/17/2020     Chief Complaint   Julius Wilson is a(n) 34 y.o. White or  female who presents to [facility], accompanied by her niece, with the following complaint(s):  Cough (x1d, bodyache, hot/cold flashes, fever, ST)       Assessment and Plan   1. Influenza A  - oseltamivir (TAMIFLU) 75 MG capsule; Take 1 capsule (75 mg total) by mouth 2 (two) times a day for 5 days.  Dispense: 10 capsule; Refill: 0    2. Fever  - Influenza A/B Rapid Test- Nasal Swab    3. Throat pain  - Rapid Strep A Screen-Throat  - Group A Strep, RNA Direct Detection, Throat      Treating influenza with oseltamivir as listed above. Discussed potential benefits and side effects of this medication with the patient. Discussed symptomatic / supportive cares, including rest, hydration, and use of alternating doses of acetaminophen and ibuprofen to manage fever and discomfort. Strep screen is negative. Reflex strep testing is in process; will prescribe amoxicillin if positive.     Counseled patient regarding assessment and plan for evaluation and treatment. Questions were answered. See AVS for the specific written instructions and educational handout(s) regarding influenza that were provided at the conclusion of the visit.     Discussed signs / symptoms that warrant urgent / emergent medical attention.     Follow up within 4 days if symptoms do not improve.      History of Present Illness   Primary symptom: Flu / Cold / Cough  Onset: Yesterday  Progression: Worsening  Fevers: Yes, not measured  Chills: Yes  Sore throat: Yes  Nasal congestion: Yes  Rhinorrhea: Clear  Ear pain: Pressure  Headache: Yes  Body aches: Yes  Cough: Yes, harsh  Shortness of breath: No  GI symptoms: Coughs to the point of retching. No vomiting or diarrhea.   Additional symptoms: Fatigue and weakness  Home therapies utilized: Ibuprofen, acetaminophen, and cough syrup.  "  Underlying lung disease: No  Exposure to influenza: No  Exposure to strep: No  Recent travel: No     Review of Systems   Review of Systems   All other systems reviewed and are negative.       Physical Exam   Vitals:    02/17/20 0903   BP: 108/70   Patient Site: Right Arm   Patient Position: Sitting   Cuff Size: Adult Large   Pulse: (!) 135   Resp: 16   Temp: (!) 101.3  F (38.5  C)   TempSrc: Oral   SpO2: 95%   Weight: 176 lb 5 oz (80 kg)   Height: 5' 5\" (1.651 m)     Physical Exam  Vitals signs and nursing note reviewed.   Constitutional:       General: She is not in acute distress.     Appearance: She is well-developed and overweight. She is ill-appearing. She is not toxic-appearing.   HENT:      Head: Normocephalic and atraumatic.      Right Ear: Tympanic membrane, ear canal and external ear normal.      Left Ear: Tympanic membrane, ear canal and external ear normal.      Nose: Mucosal edema present. No rhinorrhea.      Mouth/Throat:      Mouth: Mucous membranes are moist. No oral lesions.      Pharynx: Uvula midline. Posterior oropharyngeal erythema present. No oropharyngeal exudate.      Tonsils: No tonsillar exudate. 1+ on the right. 1+ on the left.   Eyes:      General: Lids are normal.      Conjunctiva/sclera: Conjunctivae normal.   Neck:      Musculoskeletal: Neck supple. No edema or erythema.   Cardiovascular:      Rate and Rhythm: Regular rhythm. Tachycardia present.      Heart sounds: S1 normal and S2 normal. No murmur. No friction rub. No gallop.    Pulmonary:      Effort: Pulmonary effort is normal.      Breath sounds: Normal breath sounds. No stridor. No wheezing, rhonchi or rales.   Lymphadenopathy:      Cervical: No cervical adenopathy.   Skin:     General: Skin is warm and dry.      Coloration: Skin is not pale.      Findings: No rash.   Neurological:      General: No focal deficit present.      Mental Status: She is alert and oriented to person, place, and time.          Diagnostic Studies "   Laboratory:  Results for orders placed or performed in visit on 20   Rapid Strep A Screen-Throat   Result Value Ref Range    Rapid Strep A Antigen No Group A Strep detected, presumptive negative No Group A Strep detected, presumptive negative   Influenza A/B Rapid Test- Nasal Swab   Result Value Ref Range    Influenza  A, Rapid Antigen Influenza A antigen detected (!) No Influenza A antigen detected    Influenza B, Rapid Antigen No Influenza B antigen detected No Influenza B antigen detected     Radiology:  N/A  Electrocardiogram:  N/A     Procedure Note   N/A     Pertinent History   The following portions of the patient's history were reviewed and updated as appropriate: allergies, current medications, past family history, past medical history, past social history, past surgical history and problem list.    Patient does not have a problem list on file.    Patient has a past medical history of Medical history reviewed with no changes.    Patient has a past surgical history that includes  section.    Patient's family history is not on file.    Patient reports that she has never smoked. She has never used smokeless tobacco.     Portions of this note have been dictated using voice recognition software. Any grammatical or contextual distortions are unintentional and inherent to the software.    Rico Martino MD  [facility]

## 2021-06-13 NOTE — PROGRESS NOTES
Subjective:      Patient ID: Julius Wilson is a 35 y.o. female.    Chief Complaint:    This is a 36 yo female with exposure to covid yesterday at work ().  No symptoms.  Would like a covid test.  Also slight urinary urgency for 1 days.  No dysuria or hematuria, abdominal pain, back pain, fever or chills - wonders about a uti.  No vaginal discharge.    Past Medical History:   Diagnosis Date     Medical history reviewed with no changes        Past Surgical History:   Procedure Laterality Date      SECTION       FH - noncontributory  No family history on file.    Social History     Tobacco Use     Smoking status: Never Smoker     Smokeless tobacco: Never Used   Substance Use Topics     Alcohol use: Not on file     Drug use: Not on file       Review of Systems   Constitutional: Negative.    HENT: Negative.    Eyes: Negative.    Respiratory: Negative.    Cardiovascular: Negative.    Gastrointestinal: Negative.    Endocrine: Negative.    Genitourinary: Negative.    Musculoskeletal: Negative.    Allergic/Immunologic: Negative.    Neurological: Negative.    Hematological: Negative.    Psychiatric/Behavioral: Negative.        Objective:     BP 98/64 (Patient Site: Right Arm, Patient Position: Sitting, Cuff Size: Adult Regular)   Pulse 76   Temp 98.1  F (36.7  C) (Oral)   Resp 16   Wt 169 lb 9.6 oz (76.9 kg)   SpO2 95%   BMI 28.22 kg/m      Physical Exam  Constitutional:       Appearance: Normal appearance. She is obese.   HENT:      Head: Normocephalic and atraumatic.      Right Ear: Tympanic membrane, ear canal and external ear normal.      Left Ear: Tympanic membrane, ear canal and external ear normal.      Nose: Nose normal.      Mouth/Throat:      Mouth: Mucous membranes are moist.      Pharynx: Oropharynx is clear.   Eyes:      Extraocular Movements: Extraocular movements intact.      Conjunctiva/sclera: Conjunctivae normal.      Pupils: Pupils are equal, round, and reactive to light.   Neck:       Musculoskeletal: Normal range of motion and neck supple.   Cardiovascular:      Rate and Rhythm: Normal rate and regular rhythm.      Pulses: Normal pulses.      Heart sounds: Normal heart sounds.   Pulmonary:      Effort: Pulmonary effort is normal.      Breath sounds: Normal breath sounds.   Abdominal:      General: Abdomen is flat. Bowel sounds are normal.      Palpations: Abdomen is soft.   Musculoskeletal: Normal range of motion.   Skin:     General: Skin is warm and dry.      Capillary Refill: Capillary refill takes less than 2 seconds.   Neurological:      General: No focal deficit present.      Mental Status: She is alert and oriented to person, place, and time. Mental status is at baseline.   Psychiatric:         Mood and Affect: Mood normal.         Behavior: Behavior normal.         Thought Content: Thought content normal.         Judgment: Judgment normal.       UA negative, COVID pending  Assessment:       The primary encounter diagnosis was Dysuria. A diagnosis of Exposure to COVID-19 virus was also pertinent to this visit.  Isolate for 10 days, covid pending  Reassured about urinary symptoms.

## 2021-06-13 NOTE — PATIENT INSTRUCTIONS - HE
The primary encounter diagnosis was Dysuria. A diagnosis of Exposure to COVID-19 virus was also pertinent to this visit.  Isolate for 10 days, covid pending  Reassured about urinary symptoms.

## 2021-06-16 NOTE — TELEPHONE ENCOUNTER
calling for patient. She has been having a problem with sinus infection. She is on her second course of antibiotics. Now she has lost her sense of taste in the last 24 hrs. She is wondering if she might have COVID and would like to be tested. She has a headache, fatigue and muscle aches, no fever, no difficulty breathing or chest pain or pressure.  does not have more information for triage as he is not with his wife at this time--he states he just wants to get her scheduled for a COVID test. She would need an order to get this scheduled.     Message will be forwarded to provider: Dr SYDNEY Damon FM @ [facility] clinic.     Rachel Rogers RN Triage Nurse Advisor 3:35 PM 4/21/2021

## 2021-06-16 NOTE — PROGRESS NOTES
Assessment / Plan  1. Acute recurrent maxillary sinusitis  amoxicillin-clavulanate (AUGMENTIN) 875-125 mg per tablet     Patient Instructions   Your symptoms are most consistent with acute sinusitis.  I would recommend use of Flonase nasal spray (or generic) - 2 sprays in each nostril once daily for 10 days.  This will take a few days to start to work.    You could also try using Afrin nasal spray for a few days to open up the nasal passageways.      I will also send to the pharmacy a prescription for Augmentin (antibiotic) to be taken twice daily for 10 days.      I would expect that your symptoms would begin to improve within the next 5 days.        Return in about 5 days (around 2021) for if not beginning to notice symptom improvement. .     Subjective   Julius Wilson is a 36 y.o. year old otherwise healthy female who presents with the following concerns:     Sinus Pain  Patient complains of congestion, facial pain, nasal congestion and sinus pressure. Onset of symptoms was 5 days ago. Symptoms have been gradually worsening since that time. She is drinking plenty of fluids.  Past history is significant for infrequent episodes of sinusitis. Patient does smoke.  She denies associated cough, shortness of breath, chest pain, or ear pain. No known contacts with similar symptoms.    Patient Active Problem List   Diagnosis   (none) - all problems resolved or deleted     Past Surgical History:   Procedure Laterality Date      SECTION         Social History     Tobacco Use     Smoking status: Current Some Day Smoker     Packs/day: 0.00     Smokeless tobacco: Never Used   Substance Use Topics     Alcohol use: Not on file     History reviewed. No pertinent family history.      Current Outpatient Medications   Medication Sig Dispense Refill   NONE    Allergies   Allergen Reactions     Sulfamethoxazole-Trimethoprim Other (See Comments)     Stiff neck   Stiff neck        ROS:   Negative except as noted above in  "HPI.     Objective  /40 (Patient Site: Right Arm, Patient Position: Sitting, Cuff Size: Adult Regular)   Pulse 68   Temp 98.2  F (36.8  C) (Oral)   Ht 5' 5\" (1.651 m)   Wt 176 lb (79.8 kg)   LMP 03/22/2021   SpO2 99%   BMI 29.29 kg/m       General: Alert, no acute distress.   Affect: pleasant, cooperative.  HEENT: normocephalic/atraumatic, conjunctivae are clear, Normal TMs bilaterally without erythema, pus or fluid. Nasal mucosa erythematous and swollen. Thick mucous draining down back of throat. Oropharynx is moist and clear, without tonsillar hypertrophy, asymmetry, erythema or exudate. Mild tenderness at maxillary sinuses.   Neck: supple with mild anterior cervical adenopathy bilaterally.  Lungs: Clear to auscultation without wheezes, rales or rhonci.   Heart: regular rate and rhythm, normal S1 and S2, no murmurs       Bulmaro Damon MD   Family medicine physician  [facility]     "

## 2021-06-16 NOTE — TELEPHONE ENCOUNTER
Please contact patient. Though it would not be dangerous for her to take the antibiotic, I would think that it is unnecessary given her diagnosis of COVID-19.  Please advise that she discontinue it.   Thank you,  Bulmaro Damon MD

## 2021-06-16 NOTE — TELEPHONE ENCOUNTER
Please phone patient. Given lack of improvement in symptoms with 10 day course of Augmentin, would advise alternate treatment with Cefzil 500mg twice daily for 10 days. Please ask that she let me know if symptoms are not improving over the next 5-7 days, at which point I would likely recommend consultation with ENT.   Rx sent to [facility] on [address]   Thank you,  Bulmaro Damon MD

## 2021-06-16 NOTE — TELEPHONE ENCOUNTER
Triage call:   States she tested positive yesterday at the [facility]    Was on an antibiotic for a sinus infection and got a second dose  Currently taking Cefprozil    Should she continue taking the antibiotic since she has been diagnosed positive for COVID- provider please advise.     Patient also has questions about her quarantine guidelines, writer review the quarantine guidelines with her and she verbalizes understanding.      Karime Medellin RN BSBA Care Connection Triage/Med Refill 4/22/2021 9:07 AM    COVID 19 Nurse Triage Plan/Patient Instructions    Please be aware that novel coronavirus (COVID-19) may be circulating in the community. If you develop symptoms such as fever, cough, or SOB or if you have concerns about the presence of another infection including coronavirus (COVID-19), please contact your health care provider or visit  [email or web address].    Disposition/Instructions    Home care recommended. Follow home care protocol based instructions.    Thank you for taking steps to prevent the spread of this virus.  o Limit your contact with others.  o Wear a simple mask to cover your cough.  o Wash your hands well and often.    Resources    [facility]: About COVID-19: [email or web address]    CDC: What to Do If You're Sick: www.cdc.gov/coronavirus/2019-ncov/about/steps-when-sick.html    CDC: Ending Home Isolation: www.cdc.gov/coronavirus/2019-ncov/hcp/disposition-in-home-patients.html     CDC: Caring for Someone: www.cdc.gov/coronavirus/2019-ncov/if-you-are-sick/care-for-someone.html     [facility]: Interim Guidance for Hospital Discharge to Home: www.health.state.[place]/diseases/coronavirus/hcp/hospdischarge.pdf    [facility] clinical trials (COVID-19 research studies): [email or web address]     Below are the COVID-19 hotlines at the [place] Department of Health ([facility]). Interpreters are available.   o For health questions: Call 040-744-9231  or 1-149.299.6046 (7 a.m. to 7 p.m.)  o For questions about schools and childcare: Call 446-334-8882 or 1-758.604.9366 (7 a.m. to 7 p.m.)           Reason for Disposition    Caller has NON-URGENT medication question about med that PCP prescribed and triager unable to answer question    Additional Information    Negative: Drug overdose and triager unable to answer question    Negative: Caller requesting information unrelated to medicine    Negative: Caller requesting a prescription for Strep throat and has a positive culture result    Negative: Rash while taking a medication or within 3 days of stopping it    Negative: Immunization reaction suspected    Negative: Asthma and having symptoms of asthma (cough, wheezing, etc.)    Negative: Breastfeeding questions about mother's medicines and diet    Negative: MORE THAN A DOUBLE DOSE of a prescription or over-the-counter (OTC) drug    Negative: DOUBLE DOSE (an extra dose or lesser amount) of over-the-counter (OTC) drug and any symptoms (e.g., dizziness, nausea, pain, sleepiness)    Negative: DOUBLE DOSE (an extra dose or lesser amount) of prescription drug and any symptoms (e.g., dizziness, nausea, pain, sleepiness)    Negative: Took another person's prescription drug    Negative: DOUBLE DOSE (an extra dose or lesser amount) of prescription drug and NO symptoms (Exception: a double dose of antibiotics)    Negative: Diabetes drug error or overdose (e.g., took wrong type of insulin or took extra dose)    Negative: Caller has medication question about med not prescribed by PCP and triager unable to answer question (e.g., compatibility with other med, storage)    Negative: Request for URGENT new prescription or refill of 'essential' medication (i.e., likelihood of harm to patient if not taken) and triager unable to fill per department policy    Negative: Prescription not at pharmacy and was prescribed today by PCP    Negative: Pharmacy calling with prescription questions and  triager unable to answer question    Negative: Caller has URGENT medication question about med that PCP prescribed and triager unable to answer question    Protocols used: MEDICATION QUESTION CALL-A-[place]

## 2021-06-16 NOTE — TELEPHONE ENCOUNTER
Patient calling back in regards to VM left below.  Message from Dr Damon relayed.  Patient expressed understanding, will discontinue the medication and thanked for the information.  Call was ended.

## 2021-06-16 NOTE — TELEPHONE ENCOUNTER
"Patient calling today to request a refill of the following medication, or perhaps have a new medication called in.  She reports she was in on 4/6/21 to see Dr Damon for a sinus infection.  Symptoms have not resolved since completing the medication. Symptoms \"more or less remain the same\" per patient.    Patient reporting facial pain, headaches and congestion.    Augementin 875-125 mg    [facility] on file.    Any questions please call patient at:  [phone]  "

## 2021-06-16 NOTE — PATIENT INSTRUCTIONS - HE
Your symptoms are most consistent with acute sinusitis.  I would recommend use of Flonase nasal spray (or generic) - 2 sprays in each nostril once daily for 10 days.  This will take a few days to start to work.    You could also try using Afrin nasal spray for a few days to open up the nasal passageways.      I will also send to the pharmacy a prescription for Augmentin (antibiotic) to be taken twice daily for 10 days.      I would expect that your symptoms would begin to improve within the next 5 days.

## 2021-06-16 NOTE — PATIENT INSTRUCTIONS - HE
Dear Julius Wilson    We are sorry you are not feeling well. Based on the responses you provided including felign short of breath with any activity. , it is recommended that you be seen in-person in urgent care or ER so we can better evaluate your symptoms. Please click Here to find the nearest urgent care location to you.   You will not be charged for this Visit. Thank you for trusting us with your care.    Jese Kelsey MD

## 2021-06-17 NOTE — TELEPHONE ENCOUNTER
Pt calls to report covid diagnosis 4/21/21.  Was tested at [facility].  Symptoms onset 4/19/21:  - fatigue  - body aches  - headache    No fevers at any point.  No upper resp sxs.    Workplace requires 14-day quarantine.  Workplace also requires Negative covid result prior to returning to work.  Pt therefore asks which day she should seek re-testing (?)  Advised pt to seek re-testing as close to end of 14-day period as feasible -> perhaps day 12-to-13 to ensure results will be available by day 15, and to ensure greater likelihood of a negative result.  Pt verbalizes clear understanding.  Will schedule re-testing at [facility] which works best for her.    Also discussed home care supportive cares for symptoms.  Pt agrees to plan.  No further questions at this time.    Yesi Graves RN  [facility] Triage     Reason for Disposition    [1] COVID-19 diagnosed by positive lab test AND [2] mild symptoms (e.g., cough, fever, others) AND [3] no complications or SOB    Additional Information    Negative: SEVERE difficulty breathing (e.g., struggling for each breath, speaks in single words)    Negative: Difficult to awaken or acting confused (e.g., disoriented, slurred speech)    Negative: Bluish (or gray) lips or face now    Negative: Shock suspected (e.g., cold/pale/clammy skin, too weak to stand, low BP, rapid pulse)    Negative: Sounds like a life-threatening emergency to the triager    Negative: [1] COVID-19 exposure AND [2] no symptoms    Negative: [1] Lives with someone known to have influenza (flu test positive) AND [2] flu-like symptoms (e.g., cough, runny nose, sore throat, SOB; with or without fever)    Negative: [1] Adult with possible COVID-19 symptoms AND [2] triager concerned about severity of symptoms or other causes    Negative: COVID-19 vaccine reaction suspected (e.g., fever, headache, muscle aches) occurring during days 1-3 after getting vaccine    Negative: COVID-19 vaccine, questions  about    Negative: COVID-19 and breastfeeding, questions about    Negative: SEVERE or constant chest pain or pressure (Exception: mild central chest pain, present only when coughing)    Negative: MODERATE difficulty breathing (e.g., speaks in phrases, SOB even at rest, pulse 100-120)    Negative: [1] Headache AND [2] stiff neck (can't touch chin to chest)    Negative: MILD difficulty breathing (e.g., minimal/no SOB at rest, SOB with walking, pulse <100)    Negative: Chest pain or pressure    Negative: Patient sounds very sick or weak to the triager    Negative: Fever > 103 F (39.4 C)    Negative: [1] Fever > 101 F (38.3 C) AND [2] age > 60    Negative: [1] Fever > 100.0 F (37.8 C) AND [2] bedridden (e.g., nursing home patient, CVA, chronic illness, recovering from surgery)    Negative: [1] HIGH RISK patient (e.g., age > 64 years, diabetes, heart or lung disease, weak immune system) AND [2] new or worsening symptoms    Negative: [1] HIGH RISK patient AND [2] influenza is widespread in the community AND [3] ONE OR MORE respiratory symptoms: cough, sore throat, runny or stuffy nose    Negative: [1] HIGH RISK patient AND [2] influenza exposure within the last 7 days AND [3] ONE OR MORE respiratory symptoms: cough, sore throat, runny or stuffy nose    Negative: Fever present > 3 days (72 hours)    Negative: [1] Fever returns after gone for over 24 hours AND [2] symptoms worse or not improved    Negative: [1] Continuous (nonstop) coughing interferes with work or school AND [2] no improvement using cough treatment per protocol    Negative: [1] COVID-19 infection suspected by caller or triager AND [2] mild symptoms (cough, fever, or others) AND [3] no complications or SOB    Negative: Cough present > 3 weeks    [facility] Specific Disposition - [facility] Patient Instructions  COVID 19 Nurse Triage Plan/Patient Instructions    Please be aware that novel coronavirus (COVID-19) may be circulating in the  community. If you develop symptoms such as fever, cough, or SOB or if you have concerns about the presence of another infection including coronavirus (COVID-19), please contact your health care provider or visit [email or web address]      Home care recommended. Follow home care protocol based instructions.    Thank you for taking steps to prevent the spread of this virus.  Limit your contact with others.  Wear a simple mask to cover your cough.  Wash your hands well and often.    Resources  [facility]: About COVID-19: [email or web address]  CDC: What to Do If You're Sick: www.cdc.gov/coronavirus/2019-ncov/about/steps-when-sick.html  CDC: Ending Home Isolation: www.cdc.gov/coronavirus/2019-ncov/hcp/disposition-in-home-patients.html   CDC: Caring for Someone: www.cdc.gov/coronavirus/2019-ncov/if-you-are-sick/care-for-someone.html   [facility]: Interim Guidance for Hospital Discharge to Home: www.[email or web address].mn./diseases/coronavirus/hcp/hospdischarge.pdf  [facility] clinical trials (COVID-19 research studies): [email or web address].[email or web address]-clinical-trials   Below are the COVID-19 hotlines at the [place] Department of Health ([facility]). Interpreters are available.   For health questions: Call 648-931-3904 or 1-427.677.7299 (7 a.m. to 7 p.m.)  For questions about schools and childcare: Call 313-454-0762 or 1-120.756.9357 (7 a.m. to 7 p.m.)    Protocols used: CORONAVIRUS (COVID-19) DIAGNOSED OR [id number]-H-AB 1.3.21

## 2021-06-18 NOTE — PATIENT INSTRUCTIONS - HE
Patient Instructions by Rico Martino MD at 2/17/2020  8:50 AM     Author: Rico Martino MD Service: -- Author Type: Physician    Filed: 2/17/2020  9:26 AM Encounter Date: 2/17/2020 Status: Addendum    : Rico Martino MD (Physician)    Related Notes: Original Note by Rico Martino MD (Physician) filed at 2/17/2020  9:25 AM       -You have tested positive for Influenza A.  -Complete the full course of Tamiflu as directed.  -Recommend rest, hydration, and alternating doses of acetaminophen 1000 mg every 6 hours and ibuprofen 600 mg every 6 hours as needed to manage fever, sore throat, and body aches.  -Rapid strep test is negative.  A confirmatory strep test is in process and will be finalized tomorrow.  -We will only reach out to you if the confirmatory strep test is positive.  An antibiotic will be prescribed if this test is positive.  Patient Education     Influenza (Adult)    Influenza is also called the flu. It is a viral illness that affects the air passages of your lungs. It is different from the common cold. The flu can easily be passed from one to person to another. It may be spread through the air by coughing and sneezing. Or it can be spread by touching the sick person and then touching your own eyes, nose, or mouth.  The flu starts 1 to 3 days after you are exposed to the flu virus. It may last for 1 to 2 weeks but many people feel tired or fatigued for many weeks afterward. You usually dont need to take antibiotics unless you have a complication. This might be an ear or sinus infection or pneumonia.  Symptoms of the flu may be mild or severe. They can include extreme tiredness (wanting to stay in bed all day), chills, fevers, muscle aches, soreness with eye movement, headache, and a dry, hacking cough.  Home care  Follow these guidelines when caring for yourself at home:    Avoid being around cigarette smoke, whether yours or other peoples.    Acetaminophen or ibuprofen will  help ease your fever, muscle aches, and headache. Dont give aspirin to anyone younger than 18 who has the flu. Aspirin can harm the liver.    Nausea and loss of appetite are common with the flu. Eat light meals. Drink 6 to 8 glasses of liquids every day. Good choices are water, sport drinks, soft drinks without caffeine, juices, tea, and soup. Extra fluids will also help loosen secretions in your nose and lungs.    Over-the-counter cold medicines will not make the flu go away faster. But the medicines may help with coughing, sore throat, and congestion in your nose and sinuses. Dont use a decongestant if you have high blood pressure.    Stay home until your fever has been gone for at least 24 hours without using medicine to reduce fever.  Follow-up care  Follow up with your healthcare provider, or as advised, if you are not getting better over the next week.  If you are age 65 or older, talk with your provider about getting a pneumococcal vaccine every 5 years. You should also get this vaccine if you have chronic asthma or COPD. All adults should get a flu vaccine every fall. Ask your provider about this.  When to seek medical advice  Call your healthcare provider right away if any of these occur:    Cough with lots of colored mucus (sputum) or blood in your mucus    Chest pain, shortness of breath, wheezing, or trouble breathing    Severe headache, or face, neck, or ear pain    New rash with fever    Fever of 100.4 F (38 C) or higher, or as directed by your healthcare provider    Confusion, behavior change, or seizure    Severe weakness or dizziness    You get a new fever or cough after getting better for a few days  Date Last Reviewed: 1/1/2017 2000-2017 [employer]. [address] [place] [id number]. All rights reserved. This information is not intended as a substitute for professional medical care. Always follow your healthcare professional's instructions.

## 2021-06-20 NOTE — LETTER
Letter by Rico Martino MD at      Author: Rico Martino MD Service: -- Author Type: --    Filed:  Encounter Date: 2/17/2020 Status: (Other)         February 17, 2020     Patient: Julius Wilson   YOB: 1985   Date of Visit: 2/17/2020       To Whom it May Concern:    Julius Wilson was seen in my clinic on 2/17/2020.  Please excuse her absence from work from 2/17/2020 through 2/19/2020 due to illness.    If you have any questions or concerns, please don't hesitate to call.    Sincerely,         Electronically signed by Rico Martino MD

## 2021-07-04 NOTE — ADDENDUM NOTE
Addendum Note by Jose Luis Maya MD at 4/21/2021  4:09 PM     Author: Jose Luis Maya MD Service: -- Author Type: Physician    Filed: 4/21/2021  4:09 PM Encounter Date: 4/21/2021 Status: Signed    : Jose Luis Maya MD (Physician)    Addended by: JOSE LUIS MAYA on: 4/21/2021 04:09 PM        Modules accepted: Orders

## 2021-07-14 PROBLEM — O30.039 MONOCHORIONIC DIAMNIOTIC TWIN PREGNANCY: Status: RESOLVED | Noted: 2017-03-05 | Resolved: 2021-04-19

## 2021-07-14 PROBLEM — O43.023: Status: RESOLVED | Noted: 2017-03-03 | Resolved: 2021-04-19

## 2021-07-14 PROBLEM — O30.032 MONOCHORIONIC DIAMNIOTIC TWIN GESTATION IN SECOND TRIMESTER: Status: RESOLVED | Noted: 2021-04-06 | Resolved: 2021-04-19

## 2021-07-29 ENCOUNTER — IMMUNIZATION (OUTPATIENT)
Dept: NURSING | Facility: CLINIC | Age: 36
End: 2021-07-29
Payer: COMMERCIAL

## 2021-07-29 PROCEDURE — 0001A PR COVID VAC PFIZER DIL RECON 30 MCG/0.3 ML IM: CPT

## 2021-07-29 PROCEDURE — 91300 PR COVID VAC PFIZER DIL RECON 30 MCG/0.3 ML IM: CPT

## 2021-08-19 ENCOUNTER — IMMUNIZATION (OUTPATIENT)
Dept: NURSING | Facility: CLINIC | Age: 36
End: 2021-08-19
Attending: PEDIATRICS
Payer: COMMERCIAL

## 2021-08-19 PROCEDURE — 91300 PR COVID VAC PFIZER DIL RECON 30 MCG/0.3 ML IM: CPT

## 2021-08-19 PROCEDURE — 0002A PR COVID VAC PFIZER DIL RECON 30 MCG/0.3 ML IM: CPT

## 2021-09-19 ENCOUNTER — HEALTH MAINTENANCE LETTER (OUTPATIENT)
Age: 36
End: 2021-09-19

## 2021-11-19 DIAGNOSIS — Z11.59 ENCOUNTER FOR SCREENING FOR OTHER VIRAL DISEASES: ICD-10-CM

## 2021-11-29 ENCOUNTER — OFFICE VISIT (OUTPATIENT)
Dept: FAMILY MEDICINE | Facility: CLINIC | Age: 36
End: 2021-11-29
Payer: COMMERCIAL

## 2021-11-29 VITALS
RESPIRATION RATE: 16 BRPM | OXYGEN SATURATION: 98 % | HEART RATE: 90 BPM | SYSTOLIC BLOOD PRESSURE: 102 MMHG | TEMPERATURE: 98.1 F | HEIGHT: 65 IN | WEIGHT: 179 LBS | BODY MASS INDEX: 29.82 KG/M2 | DIASTOLIC BLOOD PRESSURE: 68 MMHG

## 2021-11-29 DIAGNOSIS — N84.0 ENDOMETRIAL POLYP: ICD-10-CM

## 2021-11-29 DIAGNOSIS — Z01.818 PREOP EXAMINATION: Primary | ICD-10-CM

## 2021-11-29 PROBLEM — Z98.891 S/P CESAREAN SECTION: Status: RESOLVED | Noted: 2017-03-19 | Resolved: 2021-11-29

## 2021-11-29 PROBLEM — O43.023: Status: RESOLVED | Noted: 2017-03-03 | Resolved: 2021-11-29

## 2021-11-29 LAB — HGB BLD-MCNC: 12 G/DL (ref 11.7–15.7)

## 2021-11-29 PROCEDURE — 99214 OFFICE O/P EST MOD 30 MIN: CPT | Performed by: FAMILY MEDICINE

## 2021-11-29 PROCEDURE — 80048 BASIC METABOLIC PNL TOTAL CA: CPT | Performed by: FAMILY MEDICINE

## 2021-11-29 PROCEDURE — 85018 HEMOGLOBIN: CPT | Performed by: FAMILY MEDICINE

## 2021-11-29 PROCEDURE — 36415 COLL VENOUS BLD VENIPUNCTURE: CPT | Performed by: FAMILY MEDICINE

## 2021-11-29 ASSESSMENT — MIFFLIN-ST. JEOR: SCORE: 1489.88

## 2021-11-29 NOTE — PROGRESS NOTES
[facility]  [address] [address]  Phone: [phone]  Fax: [phone]  Primary Provider: No Ref-Primary, Physician  Pre-op Performing Provider: [facility]      PREOPERATIVE EVALUATION:  Today's date: 11/29/2021    Julius Wilson is a 36 year old female who presents for a preoperative evaluation.    Surgical Information:  Surgery/Procedure: Polyp Removal  Surgery Location: [facility]  Surgeon: Dr. Paiz  Surgery Date: 12/10/2021  Time of Surgery: unknown  Where patient plans to recover: At home with family  Fax number for surgical facility: Note does not need to be faxed, will be available electronically in Epic.    Type of Anesthesia Anticipated: Choice    Assessment & Plan     The proposed surgical procedure is considered LOW risk.    Preop examination    - Hemoglobin; Future  - Basic metabolic panel; Future  - Hemoglobin  - Basic metabolic panel    Endometrial polyp          Risks and Recommendations:  The patient has the following additional risks and recommendations for perioperative complications:   - No identified additional risk factors other than previously addressed    Medication Instructions:  Patient is on no chronic medications    RECOMMENDATION:  APPROVAL GIVEN to proceed with proposed procedure, without further diagnostic evaluation.    Review of external notes as documented above   Review of the result(s) of each unique test - lab                  Subjective     HPI related to upcoming procedure: Patient is here for a preop consultation. Having an endometrial polyp removed.  No contraindications to having surgery.        No flowsheet data found.    Health Care Directive:  Patient does not have a Health Care Directive or Living Will: Discussed advance care planning with patient; however, patient declined at this time.    Preoperative Review of :   reviewed - no record of controlled substances prescribed.    Status of Chronic  Conditions:  See problem list for active medical problems.  Problems all longstanding and stable, except as noted/documented.  See ROS for pertinent symptoms related to these conditions.      Review of Systems  CONSTITUTIONAL: NEGATIVE for fever, chills, change in weight  ENT/MOUTH: NEGATIVE for ear, mouth and throat problems  RESP: NEGATIVE for significant cough or SOB  CV: NEGATIVE for chest pain, palpitations or peripheral edema    Patient Active Problem List    Diagnosis Date Noted     S/P  section 2017     Priority: Medium     Monochorionic diamniotic twin pregnancy 2017     Priority: Medium     Twin to twin transfusion, third trimester 2017     Priority: Medium      Past Medical History:   Diagnosis Date     Medical history reviewed with no changes      Monochorionic diamniotic twin gestation in second trimester 2021     Past Surgical History:   Procedure Laterality Date      SECTION N/A 3/19/2017    Procedure:  SECTION;  Surgeon: Moon Guerrero MD;  Location:  L+D      SECTION       Current Outpatient Medications   Medication Sig Dispense Refill     acetaminophen (TYLENOL) 325 MG tablet Take 2 tablets (650 mg) by mouth every 4 hours as needed for other (surgical pain) 40 tablet 0     betamethasone acet & sod phos (CELESTONE) 6 (3-3) MG/ML SUSP injection Inject 2 mLs (12 mg) into the muscle once for 1 dose 2 mL 0     calcium carbonate (OS-ITARRA 500 MG [place]. [place]) 500 MG tablet Take 500 mg by mouth 2 times daily Reported on 2017       calcium carbonate (TUMS) 500 MG chewable tablet Take 1 chew tab by mouth daily Reported on 2017       ferrous sulfate (IRON SUPPLEMENT) 325 (65 FE) MG tablet Take 1 tablet (325 mg) by mouth daily (with breakfast) 60 tablet 0     folic acid (FOLVITE) 1 MG tablet Reported on 2017       ibuprofen (ADVIL/MOTRIN) 400 MG tablet Take 1-2 tablets (400-800 mg) by mouth every 6 hours as needed for other  (cramping) 30 tablet 0     oxyCODONE (ROXICODONE) 5 MG IR tablet Take 1-2 tablets (5-10 mg) by mouth every 4 hours as needed for moderate to severe pain (Patient not taking: Reported on 4/27/2017) 15 tablet 0     Prenatal Vit-Fe Fumarate-FA (PRENATAL MULTIVITAMIN  PLUS IRON) 27-0.8 MG TABS per tablet Take 1 tablet by mouth daily       senna-docusate (SENOKOT-S;PERICOLACE) 8.6-50 MG per tablet Take 1-2 tablets by mouth 2 times daily as needed for constipation 30 tablet 0     sertraline (ZOLOFT) 50 MG tablet Take 1 tablet (50 mg) by mouth daily (Patient not taking: Reported on 4/27/2017) 30 tablet 0     valACYclovir (VALTREX) 1000 mg tablet Take 1 tablet (1,000 mg) by mouth 3 times daily 21 tablet 0     VITAMIN D, CHOLECALCIFEROL, PO Take 2,000 Units by mouth daily Reported on 4/27/2017         Allergies   Allergen Reactions     Sulfamethoxazole-Trimethoprim Other (See Comments)     Stiff neck         Social History     Tobacco Use     Smoking status: Current Some Day Smoker     Packs/day: 0.00     Smokeless tobacco: Never Used   Substance Use Topics     Alcohol use: No     History reviewed. No pertinent family history.  History   Drug Use No         Objective     There were no vitals taken for this visit.    Physical Exam  GENERAL APPEARANCE: healthy, alert and no distress  HENT: ear canals and TM's normal and nose and mouth without ulcers or lesions  RESP: lungs clear to auscultation - no rales, rhonchi or wheezes  CV: regular rate and rhythm, normal S1 S2, no S3 or S4 and no murmur, click or rub   ABDOMEN: soft, nontender, no HSM or masses and bowel sounds normal  NEURO: Normal strength and tone, sensory exam grossly normal, mentation intact and speech normal    No results for input(s): HGB, PLT, INR, NA, POTASSIUM, CR, A1C in the last 32517 hours.     Diagnostics:  No results found for this or any previous visit (from the past 48 hour(s)).   No EKG required for low risk surgery (cataract, skin procedure, breast  biopsy, etc).    Revised Cardiac Risk Index (RCRI):  The patient has the following serious cardiovascular risks for perioperative complications:   - No serious cardiac risks = 0 points     RCRI Interpretation: 0 points: Class I (very low risk - 0.4% complication rate)           Signed Electronically by: Mateo Hood MD  Copy of this evaluation report is provided to requesting physician.

## 2021-11-30 LAB
ANION GAP SERPL CALCULATED.3IONS-SCNC: 10 MMOL/L (ref 5–18)
BUN SERPL-MCNC: 14 MG/DL (ref 8–22)
CALCIUM SERPL-MCNC: 9.7 MG/DL (ref 8.5–10.5)
CHLORIDE BLD-SCNC: 108 MMOL/L (ref 98–107)
CO2 SERPL-SCNC: 23 MMOL/L (ref 22–31)
CREAT SERPL-MCNC: 0.77 MG/DL (ref 0.6–1.1)
GFR SERPL CREATININE-BSD FRML MDRD: >90 ML/MIN/1.73M2
GLUCOSE BLD-MCNC: 125 MG/DL (ref 70–125)
POTASSIUM BLD-SCNC: 3.9 MMOL/L (ref 3.5–5)
SODIUM SERPL-SCNC: 141 MMOL/L (ref 136–145)

## 2021-12-07 ENCOUNTER — LAB (OUTPATIENT)
Dept: LAB | Facility: CLINIC | Age: 36
End: 2021-12-07
Payer: COMMERCIAL

## 2021-12-07 DIAGNOSIS — Z11.59 ENCOUNTER FOR SCREENING FOR OTHER VIRAL DISEASES: ICD-10-CM

## 2021-12-07 PROCEDURE — U0005 INFEC AGEN DETEC AMPLI PROBE: HCPCS

## 2021-12-07 PROCEDURE — U0003 INFECTIOUS AGENT DETECTION BY NUCLEIC ACID (DNA OR RNA); SEVERE ACUTE RESPIRATORY SYNDROME CORONAVIRUS 2 (SARS-COV-2) (CORONAVIRUS DISEASE [COVID-19]), AMPLIFIED PROBE TECHNIQUE, MAKING USE OF HIGH THROUGHPUT TECHNOLOGIES AS DESCRIBED BY CMS-2020-01-R: HCPCS

## 2021-12-08 LAB — SARS-COV-2 RNA RESP QL NAA+PROBE: NEGATIVE

## 2021-12-09 ENCOUNTER — ANESTHESIA EVENT (OUTPATIENT)
Dept: SURGERY | Facility: AMBULATORY SURGERY CENTER | Age: 36
End: 2021-12-09
Payer: COMMERCIAL

## 2021-12-10 ENCOUNTER — ANESTHESIA (OUTPATIENT)
Dept: SURGERY | Facility: AMBULATORY SURGERY CENTER | Age: 36
End: 2021-12-10
Payer: COMMERCIAL

## 2021-12-10 ENCOUNTER — HOSPITAL ENCOUNTER (OUTPATIENT)
Facility: AMBULATORY SURGERY CENTER | Age: 36
End: 2021-12-10
Attending: OBSTETRICS & GYNECOLOGY
Payer: COMMERCIAL

## 2021-12-10 VITALS
HEART RATE: 71 BPM | BODY MASS INDEX: 29.82 KG/M2 | HEIGHT: 65 IN | TEMPERATURE: 97 F | WEIGHT: 179 LBS | OXYGEN SATURATION: 99 % | SYSTOLIC BLOOD PRESSURE: 106 MMHG | RESPIRATION RATE: 18 BRPM | DIASTOLIC BLOOD PRESSURE: 62 MMHG

## 2021-12-10 DIAGNOSIS — N84.0 ENDOMETRIAL POLYP: Primary | ICD-10-CM

## 2021-12-10 LAB
HCG UR QL: NEGATIVE
INTERNAL QC OK POCT: NORMAL

## 2021-12-10 RX ORDER — FENTANYL CITRATE 50 UG/ML
INJECTION, SOLUTION INTRAMUSCULAR; INTRAVENOUS PRN
Status: DISCONTINUED | OUTPATIENT
Start: 2021-12-10 | End: 2021-12-10

## 2021-12-10 RX ORDER — HALOPERIDOL 5 MG/ML
1 INJECTION INTRAMUSCULAR ONCE
Status: COMPLETED | OUTPATIENT
Start: 2021-12-10 | End: 2021-12-10

## 2021-12-10 RX ORDER — IBUPROFEN 800 MG/1
800 TABLET, FILM COATED ORAL ONCE
Status: DISCONTINUED | OUTPATIENT
Start: 2021-12-10 | End: 2021-12-11 | Stop reason: HOSPADM

## 2021-12-10 RX ORDER — HYDROMORPHONE HCL IN WATER/PF 6 MG/30 ML
0.2 PATIENT CONTROLLED ANALGESIA SYRINGE INTRAVENOUS EVERY 5 MIN PRN
Status: DISCONTINUED | OUTPATIENT
Start: 2021-12-10 | End: 2021-12-11 | Stop reason: HOSPADM

## 2021-12-10 RX ORDER — PROPOFOL 10 MG/ML
INJECTION, EMULSION INTRAVENOUS CONTINUOUS PRN
Status: DISCONTINUED | OUTPATIENT
Start: 2021-12-10 | End: 2021-12-10

## 2021-12-10 RX ORDER — OXYCODONE HYDROCHLORIDE 5 MG/1
5 TABLET ORAL
Status: DISCONTINUED | OUTPATIENT
Start: 2021-12-10 | End: 2021-12-11 | Stop reason: HOSPADM

## 2021-12-10 RX ORDER — ONDANSETRON 4 MG/1
4 TABLET, ORALLY DISINTEGRATING ORAL EVERY 30 MIN PRN
Status: DISCONTINUED | OUTPATIENT
Start: 2021-12-10 | End: 2021-12-11 | Stop reason: HOSPADM

## 2021-12-10 RX ORDER — OXYCODONE HYDROCHLORIDE 5 MG/1
5 TABLET ORAL EVERY 4 HOURS PRN
Status: DISCONTINUED | OUTPATIENT
Start: 2021-12-10 | End: 2021-12-11 | Stop reason: HOSPADM

## 2021-12-10 RX ORDER — DEXAMETHASONE SODIUM PHOSPHATE 4 MG/ML
INJECTION, SOLUTION INTRA-ARTICULAR; INTRALESIONAL; INTRAMUSCULAR; INTRAVENOUS; SOFT TISSUE PRN
Status: DISCONTINUED | OUTPATIENT
Start: 2021-12-10 | End: 2021-12-10

## 2021-12-10 RX ORDER — GLYCOPYRROLATE 0.2 MG/ML
INJECTION, SOLUTION INTRAMUSCULAR; INTRAVENOUS PRN
Status: DISCONTINUED | OUTPATIENT
Start: 2021-12-10 | End: 2021-12-10

## 2021-12-10 RX ORDER — LIDOCAINE HYDROCHLORIDE 20 MG/ML
INJECTION, SOLUTION INFILTRATION; PERINEURAL PRN
Status: DISCONTINUED | OUTPATIENT
Start: 2021-12-10 | End: 2021-12-10

## 2021-12-10 RX ORDER — ONDANSETRON 2 MG/ML
INJECTION INTRAMUSCULAR; INTRAVENOUS PRN
Status: DISCONTINUED | OUTPATIENT
Start: 2021-12-10 | End: 2021-12-10

## 2021-12-10 RX ORDER — ONDANSETRON 2 MG/ML
4 INJECTION INTRAMUSCULAR; INTRAVENOUS EVERY 30 MIN PRN
Status: DISCONTINUED | OUTPATIENT
Start: 2021-12-10 | End: 2021-12-11 | Stop reason: HOSPADM

## 2021-12-10 RX ORDER — KETOROLAC TROMETHAMINE 30 MG/ML
INJECTION, SOLUTION INTRAMUSCULAR; INTRAVENOUS PRN
Status: DISCONTINUED | OUTPATIENT
Start: 2021-12-10 | End: 2021-12-10

## 2021-12-10 RX ORDER — SODIUM CHLORIDE, SODIUM LACTATE, POTASSIUM CHLORIDE, CALCIUM CHLORIDE 600; 310; 30; 20 MG/100ML; MG/100ML; MG/100ML; MG/100ML
INJECTION, SOLUTION INTRAVENOUS CONTINUOUS
Status: DISCONTINUED | OUTPATIENT
Start: 2021-12-10 | End: 2021-12-11 | Stop reason: HOSPADM

## 2021-12-10 RX ORDER — KETAMINE HYDROCHLORIDE 50 MG/ML
INJECTION, SOLUTION INTRAMUSCULAR; INTRAVENOUS PRN
Status: DISCONTINUED | OUTPATIENT
Start: 2021-12-10 | End: 2021-12-10

## 2021-12-10 RX ORDER — FENTANYL CITRATE 50 UG/ML
25 INJECTION, SOLUTION INTRAMUSCULAR; INTRAVENOUS
Status: DISCONTINUED | OUTPATIENT
Start: 2021-12-10 | End: 2021-12-11 | Stop reason: HOSPADM

## 2021-12-10 RX ORDER — LIDOCAINE 40 MG/G
CREAM TOPICAL
Status: DISCONTINUED | OUTPATIENT
Start: 2021-12-10 | End: 2021-12-11 | Stop reason: HOSPADM

## 2021-12-10 RX ORDER — OXYCODONE HYDROCHLORIDE 5 MG/1
5-10 TABLET ORAL EVERY 4 HOURS PRN
Qty: 10 TABLET | Refills: 0 | Status: SHIPPED | OUTPATIENT
Start: 2021-12-10 | End: 2022-10-17

## 2021-12-10 RX ORDER — ACETAMINOPHEN 325 MG/1
975 TABLET ORAL ONCE
Status: DISCONTINUED | OUTPATIENT
Start: 2021-12-10 | End: 2021-12-11 | Stop reason: HOSPADM

## 2021-12-10 RX ORDER — MEPERIDINE HYDROCHLORIDE 25 MG/ML
12.5 INJECTION INTRAMUSCULAR; INTRAVENOUS; SUBCUTANEOUS
Status: DISCONTINUED | OUTPATIENT
Start: 2021-12-10 | End: 2021-12-11 | Stop reason: HOSPADM

## 2021-12-10 RX ORDER — FENTANYL CITRATE 50 UG/ML
25 INJECTION, SOLUTION INTRAMUSCULAR; INTRAVENOUS EVERY 5 MIN PRN
Status: DISCONTINUED | OUTPATIENT
Start: 2021-12-10 | End: 2021-12-11 | Stop reason: HOSPADM

## 2021-12-10 RX ORDER — ACETAMINOPHEN 325 MG/1
975 TABLET ORAL ONCE
Status: COMPLETED | OUTPATIENT
Start: 2021-12-10 | End: 2021-12-10

## 2021-12-10 RX ADMIN — GLYCOPYRROLATE 0.2 MG: 0.2 INJECTION, SOLUTION INTRAMUSCULAR; INTRAVENOUS at 10:40

## 2021-12-10 RX ADMIN — PROPOFOL 300 MCG/KG/MIN: 10 INJECTION, EMULSION INTRAVENOUS at 10:42

## 2021-12-10 RX ADMIN — ONDANSETRON 4 MG: 2 INJECTION INTRAMUSCULAR; INTRAVENOUS at 10:40

## 2021-12-10 RX ADMIN — KETAMINE HYDROCHLORIDE 10 MG: 50 INJECTION, SOLUTION INTRAMUSCULAR; INTRAVENOUS at 10:47

## 2021-12-10 RX ADMIN — KETOROLAC TROMETHAMINE 15 MG: 30 INJECTION, SOLUTION INTRAMUSCULAR; INTRAVENOUS at 11:05

## 2021-12-10 RX ADMIN — KETAMINE HYDROCHLORIDE 10 MG: 50 INJECTION, SOLUTION INTRAMUSCULAR; INTRAVENOUS at 10:42

## 2021-12-10 RX ADMIN — LIDOCAINE HYDROCHLORIDE 50 MG: 20 INJECTION, SOLUTION INFILTRATION; PERINEURAL at 10:40

## 2021-12-10 RX ADMIN — HALOPERIDOL 1 MG: 5 INJECTION INTRAMUSCULAR at 12:31

## 2021-12-10 RX ADMIN — ACETAMINOPHEN 975 MG: 325 TABLET ORAL at 09:16

## 2021-12-10 RX ADMIN — ONDANSETRON 4 MG: 2 INJECTION INTRAMUSCULAR; INTRAVENOUS at 11:52

## 2021-12-10 RX ADMIN — DEXAMETHASONE SODIUM PHOSPHATE 4 MG: 4 INJECTION, SOLUTION INTRA-ARTICULAR; INTRALESIONAL; INTRAMUSCULAR; INTRAVENOUS; SOFT TISSUE at 10:45

## 2021-12-10 RX ADMIN — FENTANYL CITRATE 25 MCG: 50 INJECTION, SOLUTION INTRAMUSCULAR; INTRAVENOUS at 10:42

## 2021-12-10 RX ADMIN — SODIUM CHLORIDE, SODIUM LACTATE, POTASSIUM CHLORIDE, CALCIUM CHLORIDE: 600; 310; 30; 20 INJECTION, SOLUTION INTRAVENOUS at 09:36

## 2021-12-10 ASSESSMENT — LIFESTYLE VARIABLES: TOBACCO_USE: 1

## 2021-12-10 ASSESSMENT — MIFFLIN-ST. JEOR: SCORE: 1489.88

## 2021-12-10 NOTE — ANESTHESIA PREPROCEDURE EVALUATION
"Anesthesia Pre-Procedure Evaluation    Patient: Julius Wilson   MRN: [id number] : 1985        Preoperative Diagnosis: Endometrial polyp [N84.0]    Procedure : Procedure(s):  HYSTEROSCOPY, WITH DILATION AND CURETTAGE, POLYPECTOMY          Past Medical History:   Diagnosis Date     Medical history reviewed with no changes      Monochorionic diamniotic twin gestation in second trimester 2021     Motion sickness      S/P  section 3/19/2017     Twin to twin transfusion, third trimester 3/3/2017      Past Surgical History:   Procedure Laterality Date      SECTION N/A 3/19/2017    Procedure:  SECTION;  Surgeon: Moon Guerrero MD;  Location: [facility]      SECTION        Allergies   Allergen Reactions     Sulfamethoxazole-Trimethoprim Other (See Comments)     Stiff neck       Social History     Tobacco Use     Smoking status: Current Some Day Smoker     Packs/day: 0.00     Smokeless tobacco: Never Used     Tobacco comment: not every day smoker, \"binges\"   Substance Use Topics     Alcohol use: Yes     Comment: 1-2 / week      Wt Readings from Last 1 Encounters:   12/10/21 81.2 kg (179 lb)        Anesthesia Evaluation   Pt has had prior anesthetic. Type: Regional.        ROS/MED HX  ENT/Pulmonary:  - neg pulmonary ROS   (+) tobacco use,     Neurologic:  - neg neurologic ROS     Cardiovascular:  - neg cardiovascular ROS     METS/Exercise Tolerance:     Hematologic:  - neg hematologic  ROS     Musculoskeletal:  - neg musculoskeletal ROS     GI/Hepatic:  - neg GI/hepatic ROS     Renal/Genitourinary:  - neg Renal ROS     Endo:  - neg endo ROS     Psychiatric/Substance Use:  - neg psychiatric ROS     Infectious Disease:  - neg infectious disease ROS     Malignancy:  - neg malignancy ROS     Other:  - neg other ROS          Physical Exam    Airway        Mallampati: II   TM distance: > 3 FB   Neck ROM: full   Mouth opening: > 3 cm    Respiratory Devices and Support     "     Dental       (+) chipped    B=Bridge, C=Chipped, L=Loose, M=Missing    Cardiovascular   cardiovascular exam normal       Rhythm and rate: regular and normal     Pulmonary   pulmonary exam normal        breath sounds clear to auscultation           OUTSIDE LABS:  CBC:   Lab Results   Component Value Date    WBC 11.1 (H) 03/27/2017    WBC 14.9 (H) 03/18/2017    HGB 12.0 11/29/2021    HGB 9.5 (L) 03/27/2017    HCT 29.5 (L) 03/27/2017    HCT 30.8 (L) 03/18/2017     03/27/2017     03/18/2017     BMP:   Lab Results   Component Value Date     11/29/2021    POTASSIUM 3.9 11/29/2021    CHLORIDE 108 (H) 11/29/2021    CO2 23 11/29/2021    BUN 14 11/29/2021    CR 0.77 11/29/2021     11/29/2021     COAGS: No results found for: PTT, INR, FIBR  POC: No results found for: BGM, HCG, HCGS  HEPATIC: No results found for: ALBUMIN, PROTTOTAL, ALT, AST, GGT, ALKPHOS, BILITOTAL, BILIDIRECT, KAYLIE  OTHER:   Lab Results   Component Value Date    TIARRA 9.7 11/29/2021       Anesthesia Plan    ASA Status:  2   NPO Status:  NPO Appropriate    Anesthesia Type: MAC.     - Reason for MAC: immobility needed, straight local not clinically adequate   Induction: Propofol.   Maintenance: TIVA.        Consents    Anesthesia Plan(s) and associated risks, benefits, and realistic alternatives discussed. Questions answered and patient/representative(s) expressed understanding.    - Discussed:     - Discussed with:  Patient         Postoperative Care    Pain management: Multi-modal analgesia.   PONV prophylaxis: Ondansetron (or other 5HT-3), Dexamethasone or Solumedrol     Comments:    Other Comments: Toradol if OK with surgeon    Reviewed anesthetic options and risks. Patient agrees to proceed.       Recent Results (from the past 120 hour(s))  -Asymptomatic COVID-19 Virus (Coronavirus) by PCR Nose:   Collection Time: 12/07/21  3:42 PM  Specimen: Nose; Swab       Result                                            Value                          Ref Range                       SARS CoV2 PCR                                     Negative                      Negative                             Ravindra Ball MD

## 2021-12-10 NOTE — ANESTHESIA CARE TRANSFER NOTE
Patient: Julius Wilson    Procedure: Procedure(s):  HYSTEROSCOPY, WITH DILATION AND CURETTAGE, POLYPECTOMY       Diagnosis: Endometrial polyp [N84.0]  Diagnosis Additional Information: No value filed.    Anesthesia Type:   MAC     Note:    Oropharynx: oropharynx clear of all foreign objects  Level of Consciousness: drowsy  Oxygen Supplementation: face mask  Level of Supplemental Oxygen (L/min / FiO2): 8  Independent Airway: airway patency satisfactory and stable  Dentition: dentition unchanged  Vital Signs Stable: post-procedure vital signs reviewed and stable  Report to RN Given: handoff report given  Patient transferred to: Phase II    Handoff Report: Identifed the Patient, Identified the Reponsible Provider, Reviewed the pertinent medical history, Discussed the surgical course, Reviewed Intra-OP anesthesia mangement and issues during anesthesia, Set expectations for post-procedure period and Allowed opportunity for questions and acknowledgement of understanding      Vitals:  Vitals Value Taken Time   BP 98/58    Temp 36C    Pulse 70    Resp 12    SpO2 100        Electronically Signed By: YOLANDA Steward CRNA  December 10, 2021  11:15 AM

## 2021-12-10 NOTE — H&P
History and Physical Update    I have examined the patient and reviewed the history and physical that is present on this chart. The changes in the patient's history and physical condition are as follows:    None    Leighton Paiz MD

## 2021-12-10 NOTE — ANESTHESIA POSTPROCEDURE EVALUATION
Patient: Julius Wilson    Procedure: Procedure(s):  HYSTEROSCOPY, WITH DILATION AND CURETTAGE, POLYPECTOMY       Diagnosis:Endometrial polyp [N84.0]  Diagnosis Additional Information: No value filed.    Anesthesia Type:  MAC    Note:  Disposition: Outpatient   Postop Pain Control: Uneventful            Sign Out: Well controlled pain   PONV: No   Neuro/Psych: Uneventful            Sign Out: Acceptable/Baseline neuro status   Airway/Respiratory: Uneventful            Sign Out: Acceptable/Baseline resp. status   CV/Hemodynamics: Uneventful            Sign Out: Acceptable CV status; No obvious hypovolemia; No obvious fluid overload   Other NRE: NONE   DID A NON-ROUTINE EVENT OCCUR? No           Last vitals:  Vitals Value Taken Time   BP 97/63 12/10/21 1251   Temp 97  F (36.1  C) 12/10/21 1118   Pulse 68 12/10/21 1258   Resp 18 12/10/21 1118   SpO2 95 % 12/10/21 1258   Vitals shown include unvalidated device data.    Electronically Signed By: Ravindra Ball MD  December 10, 2021  1:22 PM

## 2021-12-10 NOTE — DISCHARGE INSTRUCTIONS
You received 975 mg of acetaminophen (Tylenol) at 9:16 AM. Do not exceed 4,000 mg of acetaminophen during a 24 hour period and keep in mind that acetaminophen can also be found in many over-the-counter cold medications as well as narcotics that may be given for pain.     You received a medication called Toradol (a stronger IV ibuprofen) at 11:05 AM. Do NOT take any Ibuprofen / Advil / Motrin / Aleve / Naproxen or products containing Ibuprofen until 5:05 PM or later.    Dr. Paiz's Office [phone]

## 2021-12-30 NOTE — OP NOTE
Procedure Date: 12/10/2021    PREOPERATIVE DIAGNOSES:  Uterine polyp, possible uterine isthmocele.    POSTOPERATIVE DIAGNOSIS:  Uterine polyp.    PROCEDURE:  Hysteroscopy, dilatation and curettage, polypectomy.    SURGEON:  Leighton Paiz MD    ANESTHESIA:  MAC.    ESTIMATED BLOOD LOSS:  Minimal, replaced with lactated Ringer's.    DRAINS:  None.    COMPLICATIONS:  None.    INDICATIONS FOR PROCEDURE:  This is a 36-year-old female who had a recent ultrasound suggesting an endometrial polyp and a possible uterine isthmocele.  She was given informed consent for the above.  The risks, benefits and alternatives were discussed at length.  She expressed an understanding and wished to proceed.    OPERATIVE FINDINGS:  Uterus sounded to 8 cm.  There was a large polyp in the anterior uterine wall. Lining was slightly heterogeneous, most consistent with a dyssynchronous endometrium.  There was no defect located in the lower uterine wall.    DESCRIPTION OF PROCEDURE:  The patient was brought to the operating room and after induction of IV sedation, was placed in the dorsal lithotomy position.  A timeout was called and the patient and the procedure were verified.  Bimanual exam was performed.  No adnexal masses were noted.  Bladder was drained of urine.  A bivalve speculum was placed.  The cervix was infiltrated with 1% lidocaine with epinephrine and a single tooth tenaculum was attached.  The uterus was sounded.  Perez cervical dilators was used to dilate the cervix.  An 8-Latvian hysteroscope was introduced with findings as noted above.  Hologic Reach was then used to shave the polyp and sample the endometrium.  A thorough view of the endometrium was obtained.  Both ostia appeared normal.  There was no identifiable defect in the anterior uterine wall.  At this point, the decision was made to terminate the procedure.  All specimens removed were submitted for pathologic exam.  Sponge and instrument counts were correct.  The  patient tolerated the procedure well and was taken to the recovery room in good condition.    Leighton Paiz MD        D: 2021   T: 2021   MT: steffanie    Name:     PENNIE YI  MRN:      [id number]        Account:        [id number]   :      1985           Procedure Date: 12/10/2021     Document: [id number]

## 2022-03-05 ENCOUNTER — HEALTH MAINTENANCE LETTER (OUTPATIENT)
Age: 37
End: 2022-03-05

## 2022-10-17 ENCOUNTER — ANCILLARY PROCEDURE (OUTPATIENT)
Dept: GENERAL RADIOLOGY | Facility: CLINIC | Age: 37
End: 2022-10-17
Attending: PHYSICIAN ASSISTANT
Payer: COMMERCIAL

## 2022-10-17 ENCOUNTER — OFFICE VISIT (OUTPATIENT)
Dept: FAMILY MEDICINE | Facility: CLINIC | Age: 37
End: 2022-10-17
Payer: COMMERCIAL

## 2022-10-17 ENCOUNTER — TELEPHONE (OUTPATIENT)
Dept: URGENT CARE | Facility: URGENT CARE | Age: 37
End: 2022-10-17

## 2022-10-17 VITALS
SYSTOLIC BLOOD PRESSURE: 107 MMHG | DIASTOLIC BLOOD PRESSURE: 70 MMHG | WEIGHT: 183 LBS | HEART RATE: 83 BPM | TEMPERATURE: 97.9 F | RESPIRATION RATE: 16 BRPM | OXYGEN SATURATION: 99 % | BODY MASS INDEX: 30.93 KG/M2

## 2022-10-17 DIAGNOSIS — R05.1 ACUTE COUGH: ICD-10-CM

## 2022-10-17 DIAGNOSIS — R05.1 ACUTE COUGH: Primary | ICD-10-CM

## 2022-10-17 PROCEDURE — 99214 OFFICE O/P EST MOD 30 MIN: CPT | Performed by: PHYSICIAN ASSISTANT

## 2022-10-17 PROCEDURE — 71046 X-RAY EXAM CHEST 2 VIEWS: CPT | Mod: TC | Performed by: RADIOLOGY

## 2022-10-17 RX ORDER — ALBUTEROL SULFATE 90 UG/1
2 AEROSOL, METERED RESPIRATORY (INHALATION) EVERY 6 HOURS
Qty: 18 G | Refills: 0 | Status: SHIPPED | OUTPATIENT
Start: 2022-10-17

## 2022-10-17 RX ORDER — BENZONATATE 100 MG/1
100 CAPSULE ORAL 3 TIMES DAILY PRN
Qty: 21 CAPSULE | Refills: 0 | Status: SHIPPED | OUTPATIENT
Start: 2022-10-17

## 2022-10-17 RX ORDER — PREDNISONE 20 MG/1
40 TABLET ORAL DAILY
Qty: 10 TABLET | Refills: 0 | Status: SHIPPED | OUTPATIENT
Start: 2022-10-17 | End: 2022-10-22

## 2022-10-17 RX ORDER — IBUPROFEN 600 MG/1
600 TABLET, FILM COATED ORAL EVERY 6 HOURS PRN
COMMUNITY
Start: 2022-08-26

## 2022-10-17 ASSESSMENT — ENCOUNTER SYMPTOMS
FEVER: 1
SINUS PAIN: 1
COUGH: 1
FATIGUE: 1
SINUS PRESSURE: 1

## 2022-10-17 NOTE — TELEPHONE ENCOUNTER
Left message to call back.  Patient's chest x-ray was negative.  See patient instructions from 10/17/2022.  Prescriptions for albuterol, prednisone, and Tessalon Perles were sent to the patient's preferred pharmacy.      [id number] - [place], [place] - [address] AT [facility] [address]  [address]  [place] [address]  Phone: [phone] Fax: [phone]

## 2022-10-17 NOTE — PATIENT INSTRUCTIONS
There were no signs of pneumonia on your xray.    Your symptoms are most likely due to acute bronchitis.  This is inflammation of the tubes leading into the lungs, most often due to a viral infection and an antibiotic will not help this.    I have prescribed an albuterol inhaler to help with the cough/wheezing.  This can be used as needed every 6 hours.    Begin taking Prednisone 40 mg daily.  This medication is best taken in the morning and with food.  You can take your first dose right away to get things rolling.    May take Tessalon Perles as needed for cough.  May also try to use Mucinex or Robitussin.    Please monitor symptoms carefully.  If developing worsening chest pain, shortness of breath, fever, coughing up blood, extreme fatigue, or any other new, concerning symptoms, come back to clinic or go to ER immediately.  Otherwise, if no improvement in symptoms in one week, follow-up with your primary care provider.

## 2022-10-21 ENCOUNTER — OFFICE VISIT (OUTPATIENT)
Dept: FAMILY MEDICINE | Facility: CLINIC | Age: 37
End: 2022-10-21
Payer: COMMERCIAL

## 2022-10-21 ENCOUNTER — NURSE TRIAGE (OUTPATIENT)
Dept: NURSING | Facility: CLINIC | Age: 37
End: 2022-10-21

## 2022-10-21 VITALS
BODY MASS INDEX: 31.1 KG/M2 | DIASTOLIC BLOOD PRESSURE: 65 MMHG | WEIGHT: 184 LBS | SYSTOLIC BLOOD PRESSURE: 96 MMHG | RESPIRATION RATE: 28 BRPM | HEART RATE: 81 BPM | OXYGEN SATURATION: 98 %

## 2022-10-21 DIAGNOSIS — J20.9 ACUTE BRONCHITIS, UNSPECIFIED ORGANISM: Primary | ICD-10-CM

## 2022-10-21 DIAGNOSIS — R53.83 OTHER FATIGUE: ICD-10-CM

## 2022-10-21 DIAGNOSIS — Z20.822 EXPOSURE TO 2019 NOVEL CORONAVIRUS: ICD-10-CM

## 2022-10-21 DIAGNOSIS — Z20.828 EXPOSURE TO INFLUENZA: ICD-10-CM

## 2022-10-21 PROCEDURE — U0003 INFECTIOUS AGENT DETECTION BY NUCLEIC ACID (DNA OR RNA); SEVERE ACUTE RESPIRATORY SYNDROME CORONAVIRUS 2 (SARS-COV-2) (CORONAVIRUS DISEASE [COVID-19]), AMPLIFIED PROBE TECHNIQUE, MAKING USE OF HIGH THROUGHPUT TECHNOLOGIES AS DESCRIBED BY CMS-2020-01-R: HCPCS | Performed by: FAMILY MEDICINE

## 2022-10-21 PROCEDURE — 99214 OFFICE O/P EST MOD 30 MIN: CPT | Mod: CS | Performed by: FAMILY MEDICINE

## 2022-10-21 PROCEDURE — U0005 INFEC AGEN DETEC AMPLI PROBE: HCPCS | Performed by: FAMILY MEDICINE

## 2022-10-21 RX ORDER — AZITHROMYCIN 250 MG/1
TABLET, FILM COATED ORAL
Qty: 6 TABLET | Refills: 0 | Status: SHIPPED | OUTPATIENT
Start: 2022-10-21 | End: 2022-10-26

## 2022-10-21 RX ORDER — AZITHROMYCIN 250 MG/1
TABLET, FILM COATED ORAL
Qty: 6 TABLET | Refills: 0 | Status: SHIPPED | OUTPATIENT
Start: 2022-10-21 | End: 2022-10-21

## 2022-10-21 NOTE — TELEPHONE ENCOUNTER
Patient was seen on Monday as a walk in.  Patient has a horrible cough.  Given albuterol, prednisone and Tessalon.  Today is week and fatigued and coughing.  Patient was getting better and now turned for the worst.  Patient did a home test this week that is negative for covid.  Patient denies severe difficulty breathing and bluish lips.  Denies coughing up blood and denies wheezing.  Denies fever.  Cough is very deep and continuous.      Reason for Disposition    Continuous (nonstop) coughing interferes with work or school and no improvement using cough treatment per Care Advice    Additional Information    Negative: Bluish (or gray) lips or face    Negative: SEVERE difficulty breathing (e.g., struggling for each breath, speaks in single words)    Negative: Rapid onset of cough and has hives    Negative: Coughing started suddenly after medicine, an allergic food or bee sting    Negative: Difficulty breathing after exposure to flames, smoke, or fumes    Negative: Sounds like a life-threatening emergency to the triager    Negative: MODERATE difficulty breathing (e.g., speaks in phrases, SOB even at rest, pulse 100-120) and still present when not coughing    Negative: Chest pain present when not coughing    Negative: Passed out (i.e., fainted, collapsed and was not responding)    Negative: Patient sounds very sick or weak to the triager    Negative: MILD difficulty breathing (e.g., minimal/no SOB at rest, SOB with walking, pulse <100) and still present when not coughing    Negative: Coughed up > 1 tablespoon (15 ml) blood (Exception: Blood-tinged sputum.)    Negative: Fever > 100.0 F (37.8 C) and bedridden (e.g., nursing home patient, stroke, chronic illness, recovering from surgery)    Negative: Fever > 100.0 F (37.8 C) and has diabetes mellitus or a weak immune system (e.g., HIV positive, cancer chemotherapy, organ transplant, splenectomy, chronic steroids)    Negative: Fever > 101 F (38.3 C) and over 60 years of  age    Negative: Fever > 103 F (39.4 C)    Negative: Increasing ankle swelling    Negative: Wheezing is present    Negative: SEVERE coughing spells (e.g., whooping sound after coughing, vomiting after coughing)    Negative: Coughing up jean claude-colored (reddish-brown) or blood-tinged sputum    Negative: Fever present > 3 days (72 hours)    Negative: Fever returns after gone for over 24 hours and symptoms worse or not improved    Negative: Using nasal washes and pain medicine > 24 hours and sinus pain persists    Negative: Known COPD or other severe lung disease (i.e., bronchiectasis, cystic fibrosis, lung surgery) and worsening symptoms (i.e., increased sputum purulence or amount, increased breathing difficulty)    Protocols used: COUGH-A-OH

## 2022-10-21 NOTE — PROGRESS NOTES
SUBJECTIVE:  Julius Wilson is a 37 year old female who presents to the clinic today with a chief complaint of cough  and tired for 2 week(s).  Her cough is described as persistent and not productive   The patient's symptoms are severe and not changing over the course of time.  Associated symptoms include congestion, nasal congestion, rhinorrhea and ear pain. The patient's symptoms are exacerbated by no particular triggers  Patient has been using albuterol nebs, decongestants and prednisone, tessalon  to improve symptoms.    Patient first complains of her very persistent deep, hacking, violent cough. It is keeping her up at night, also present during the day. She notes it breaks up but there is no sputum coming up. For this she was seen on Monday. Was seen and examined. This was reviewed. She notes a chest xray did not show pneumonia. Has taken the prednisone and albuterol she was prescribed. There is no change. She reports exposure to influenza a the previous week, her son has this. She did not want to test for influenza since it does not . She did not want to test for covid 19 as this was negative at home. She states there has been an exposure to covid 19 since the last visit. She is very tired and does not know why. She is tolerating diet. She does not have fever. She has a headache and body aches. Her ear hurts also.   She has nausea now. Has not vomited. Is trying to drink a lot of liquids. Has normal urine. No diarrhea. No chest pain. No wheezing or trouble breathing.       Answers for HPI/ROS submitted by the patient on 10/21/2022  Headache Symptoms are: worsened  How often are you getting headaches or migraines? : today  Are you able to do normal daily activities when you have a migraine?: No  Migraine Rescue/Relief Medications:: other  Effectiveness of rescue/relief medications:: The relief is inconsistent  Migraine Preventative Medications:: no medications to prevent migraines  ER or  UC Visits:: 0 times  Your back pain is: new  What do you think is the original cause of your back pain?: not sure  When did you first notice your back pain? : today  How would you describe your back pain? : cramping, dull ache  How often do you feel your back pain? : rarely  Where is your back pain located? : right lower back  Where does your back pain spread? : nowhere  Since you noticed your back pain, how has it changed? : unchanged  Does your back pain interfere with your job?: No  On a scale of 1-10 (10 being the worst), how strong is your back pain?: 4  What makes your back pain worse? : nothing  Acupuncture:: not tried  Acetaminophen: not tried  Activity or Exercise: not tried  Chiropractor: not tried  Heat: helpful  Massage: not tried  Muscle relaxants : not tried  NSAIDS (Ibuprofen, Naproxen) : not tried  Opioids: not tried  Physical Therapy: not tried  Rest: not tried  Steroid Injection: not tried  Stretching : not tried  Surgery: not tried  TENS Unit: not tried  Topical pain relievers : not tried  Do you see any other healthcare providers for your back pain? : None  What is the reason for your visit today? : follow up  How many servings of fruits and vegetables do you eat daily?: 2-3  On average, how many sweetened beverages do you drink each day (Examples: soda, juice, sweet tea, etc.  Do NOT count diet or artificially sweetened beverages)?: 0  How many minutes a day do you exercise enough to make your heart beat faster?: 30 to 60  How many days a week do you exercise enough to make your heart beat faster?: 3 or less  How many days per week do you miss taking your medication?: 0      Past Medical History:   Diagnosis Date     Medical history reviewed with no changes      Monochorionic diamniotic twin gestation in second trimester 2021     Motion sickness      S/P  section 3/19/2017     Twin to twin transfusion, third trimester 3/3/2017     Current Outpatient Medications   Medication Sig  Dispense Refill     azithromycin (ZITHROMAX) 250 MG tablet Take 2 tablets (500 mg) by mouth daily for 1 day, THEN 1 tablet (250 mg) daily for 4 days. 6 tablet 0     albuterol (PROAIR HFA/PROVENTIL HFA/VENTOLIN HFA) 108 (90 Base) MCG/ACT inhaler Inhale 2 puffs into the lungs every 6 hours 18 g 0     benzonatate (TESSALON) 100 MG capsule Take 1 capsule (100 mg) by mouth 3 times daily as needed for cough 21 capsule 0     ibuprofen (ADVIL/MOTRIN) 600 MG tablet Take 600 mg by mouth every 6 hours as needed       History   Smoking Status     Some Days     Packs/day: 0.00     Types: Cigarettes   Smokeless Tobacco     Never     OBJECTIVE:  BP 96/65 (BP Location: Left arm, Patient Position: Sitting, Cuff Size: Adult Regular)   Pulse 81   Resp 28   Wt 83.5 kg (184 lb)   LMP 10/01/2022 (Approximate)   SpO2 98%   BMI 31.10 kg/m    GENERAL APPEARANCE: alert, no distress and uncooperative  EYES: EOMI,  PERRL, conjunctiva clear  HENT: ear canals and TM's normal.  Nose and mouth without ulcers, erythema or lesions  NECK: supple, nontender, no lymphadenopathy  RESP: lungs clear to auscultation - no rales, rhonchi or wheezes  CV: regular rates and rhythm, normal S1 S2, no murmur noted  NEURO: Normal strength and tone, sensory exam grossly normal,  normal speech and mentation  SKIN: no suspicious lesions or rashes  PSYCH: anxious    ASSESSMENT:    1. Acute bronchitis, unspecified organism  - Symptomatic; Yes; 10/1/2022 COVID-19 Virus (Coronavirus) by PCR Nose  - azithromycin (ZITHROMAX) 250 MG tablet; Take 2 tablets (500 mg) by mouth daily for 1 day, THEN 1 tablet (250 mg) daily for 4 days.  Dispense: 6 tablet; Refill: 0  2. Other fatigue  3. Exposure to influenza  4. Exposure to 2019 novel coronavirus    PLAN:  Orders Placed This Encounter   Procedures     Symptomatic; Yes; 10/1/2022 COVID-19 Virus (Coronavirus) by PCR Nose       EHR reviewed.   Past medical history, problem list, past surgical history, family history, social  "history, medications reviewed, updated, reconciled.   I reviewed her evaluation and treatment on Monday. We reviewed the results of her xray. We reviewed possible further evaluation and treatment today such as repeat xray, antibiotics, treatment of symptoms such as nausea. Patient becomes very upset and swearing, asking why we are talking about her cough. She defers imaging. She reports she just feels so tired and fatigued. She is upset at the question of influenza or covid 19. She does agree the underlying issue could be influenza. We discussed that if her cough is persistent or worsening, she could consider something like azithromycin. I noted her vital signs are normal. Her exam is unremarkable. She is reassured by this. I did offer blood work such as a cbc and bmp to assess her severity of illness, she refuses. I offered medication for nausea such as zofran, she questions the validity of that medication and says \"how do I know that is gonna work\".  I discussed that if her symptoms are so severe she could consider presentation at urgent or emergent care, but she declines. Reviewed the natural course of most viral upper respiratory illnesses, the possible complications, reasons to call her PCP or be seen urgently. She understands, but is upset I asked her to call or follow up if her symptoms persist or worsen. By the end of the visit, she reports she will try azithromycin, collect covid testing.     "

## 2022-10-22 LAB — SARS-COV-2 RNA RESP QL NAA+PROBE: NEGATIVE

## 2022-11-20 ENCOUNTER — HEALTH MAINTENANCE LETTER (OUTPATIENT)
Age: 37
End: 2022-11-20

## 2023-01-09 ENCOUNTER — OFFICE VISIT (OUTPATIENT)
Dept: FAMILY MEDICINE | Facility: CLINIC | Age: 38
End: 2023-01-09
Payer: COMMERCIAL

## 2023-01-09 VITALS
BODY MASS INDEX: 31.59 KG/M2 | HEIGHT: 65 IN | OXYGEN SATURATION: 100 % | SYSTOLIC BLOOD PRESSURE: 106 MMHG | DIASTOLIC BLOOD PRESSURE: 72 MMHG | HEART RATE: 74 BPM | TEMPERATURE: 97.9 F | RESPIRATION RATE: 17 BRPM | WEIGHT: 189.6 LBS

## 2023-01-09 DIAGNOSIS — M54.2 NECK PAIN: Primary | ICD-10-CM

## 2023-01-09 DIAGNOSIS — S16.1XXA STRAIN OF NECK MUSCLE, INITIAL ENCOUNTER: ICD-10-CM

## 2023-01-09 PROCEDURE — 99213 OFFICE O/P EST LOW 20 MIN: CPT | Performed by: FAMILY MEDICINE

## 2023-01-09 RX ORDER — METHOCARBAMOL 750 MG/1
750 TABLET, FILM COATED ORAL 3 TIMES DAILY
Qty: 15 TABLET | Refills: 0 | Status: SHIPPED | OUTPATIENT
Start: 2023-01-09

## 2023-01-09 RX ORDER — METHOCARBAMOL 750 MG/1
750 TABLET, FILM COATED ORAL 3 TIMES DAILY
Qty: 15 TABLET | Refills: 0 | Status: SHIPPED | OUTPATIENT
Start: 2023-01-09 | End: 2023-01-09

## 2023-01-09 RX ORDER — NAPROXEN SODIUM 220 MG
440 TABLET ORAL 2 TIMES DAILY WITH MEALS
Qty: 20 TABLET | Refills: 0 | COMMUNITY
Start: 2023-01-09

## 2023-01-09 ASSESSMENT — PAIN SCALES - GENERAL: PAINLEVEL: SEVERE PAIN (7)

## 2023-01-09 NOTE — PROGRESS NOTES
"  Assessment & Plan     Neck pain  Symptoms consistent with muscle strain.  - naproxen sodium (ANAPROX) 220 MG tablet; Take 2 tablets (440 mg) by mouth 2 times daily (with meals)  - methocarbamol (ROBAXIN) 750 MG tablet; Take 1 tablet (750 mg) by mouth 3 times daily    Strain of neck muscle, initial encounter  discussed training and rehabilitation of muscle strains with emphasis on icing/heating, tylenol/NSAIDs/muscle relaxer, stretching and easing into eccentric load strengthening.  If persist will return consider PT or imaging    - naproxen sodium (ANAPROX) 220 MG tablet; Take 2 tablets (440 mg) by mouth 2 times daily (with meals)  - methocarbamol (ROBAXIN) 750 MG tablet; Take 1 tablet (750 mg) by mouth 3 times daily      BMI:   Estimated body mass index is 31.4 kg/m  as calculated from the following:    Height as of this encounter: 1.655 m (5' 5.16\").    Weight as of this encounter: 86 kg (189 lb 9.6 oz).   Weight management plan: Discussed healthy diet and exercise guidelines    Return in about 2 weeks (around 1/23/2023) for call with update.    Larry Walters MD  [facility] KARMEN Madrid is a 37 year old, presenting for the following health issues:  Neck Pain     3 weeks ago   While making sandwich and getting up had sudden pain in neck   Lasted about 1 week    3 days ago   Was taking a shower   Non radiatating.   It is better    Been taking ibuprofen, icing, heating.   Worse: constant   Tender with movements.      Care gaps: georginao, pcv. Flu, covid#3  History of Present Illness       Reason for visit:  Neck  Symptom onset:  1-3 days ago  Symptoms include:  Stiff neck  Symptom intensity:  Moderate  Symptom progression:  Improving  Had these symptoms before:  Yes  Has tried/received treatment for these symptoms:  No    She eats 0-1 servings of fruits and vegetables daily.She consumes 0 sweetened beverage(s) daily.She exercises with enough effort to increase her heart rate " "9 or less minutes per day.  She exercises with enough effort to increase her heart rate 3 or less days per week.   She is taking medications regularly.       Review of Systems   Constitutional, HEENT, cardiovascular, pulmonary, gi and gu systems are negative, except as otherwise noted.      Objective    /72 (BP Location: Right arm, Cuff Size: Adult Regular)   Pulse 74   Temp 97.9  F (36.6  C) (Oral)   Resp 17   Ht 1.655 m (5' 5.16\")   Wt 86 kg (189 lb 9.6 oz)   LMP 12/23/2022   SpO2 100%   BMI 31.40 kg/m    Body mass index is 31.4 kg/m .  Physical Exam   GENERAL: healthy, alert and no distress  RESP: lungs clear to auscultation - no rales, rhonchi or wheezes  CV: regular rate and rhythm, normal S1 S2, no S3 or S4, no murmur, click or rub  MS:   NECK: Tenderness especially over the left trapezius muscle, no tenderness along the spine, mild reduction of range of motion  NEURO: Normal strength and tone, mentation intact and speech normal    "

## 2023-02-21 ENCOUNTER — LAB REQUISITION (OUTPATIENT)
Dept: LAB | Facility: CLINIC | Age: 38
End: 2023-02-21
Payer: COMMERCIAL

## 2023-02-21 DIAGNOSIS — R35.0 FREQUENCY OF MICTURITION: ICD-10-CM

## 2023-02-21 PROCEDURE — 87086 URINE CULTURE/COLONY COUNT: CPT | Mod: ORL | Performed by: OBSTETRICS & GYNECOLOGY

## 2023-02-23 LAB — BACTERIA UR CULT: NO GROWTH

## 2023-04-15 ENCOUNTER — HEALTH MAINTENANCE LETTER (OUTPATIENT)
Age: 38
End: 2023-04-15

## 2024-04-13 ENCOUNTER — HEALTH MAINTENANCE LETTER (OUTPATIENT)
Age: 39
End: 2024-04-13

## 2024-06-22 ENCOUNTER — HEALTH MAINTENANCE LETTER (OUTPATIENT)
Age: 39
End: 2024-06-22

## 2024-08-24 NOTE — TELEPHONE ENCOUNTER
Mom called at 6:10 PM on 17 to [facility] N[facility] to report that her  has been diagnosed with shingles, wondering what to do for herself and their  boys. Twins were born at 31 weeks, discharged home at 36 weeks and now are 38 weeks corrected age. 's shingles is on his back. He has been prescribed valacyclovir 1 g TID x 7 days. Mom has had chicken pox before. She is breastfeeding and pumping. She is currently asymptomatic.      I spoke to Dr. Damaris Carrington on Peds ID for recommendations.     Plan:   -  should keep shingles covered at all times.   -  should take his valacyclovir.   - Valacyclovir 1 g TID x 7 days or until 's lesions are crusted over and not weeping.   - Mom and babies should not be exposed to open lesions.   - Monitor for signs of shingles rash and proceed to OB or PCP for appointment if this happens. Stop breastfeeding (pump and dump) if rash on breasts.     Cha Alvarez MD  NICU Fellow  
[facility] shock

## 2025-07-12 ENCOUNTER — HEALTH MAINTENANCE LETTER (OUTPATIENT)
Age: 40
End: 2025-07-12

## (undated) DEVICE — DRAPE SETUP C-SECTION INVISISHIELD 54X90" DYNJE5600

## (undated) DEVICE — SU VICRYL 3-0 CTX 36" UND J980H

## (undated) DEVICE — SOL WATER IRRIG 1000ML BOTTLE 07139-09

## (undated) DEVICE — PACK C-SECTION LF PL15OTA83B

## (undated) DEVICE — STOCKING SLEEVE COMPRESSION CALF LG

## (undated) DEVICE — SUCTION CANISTER MEDIVAC LINER 1500ML W/LID 65651-515

## (undated) DEVICE — BASIN SET MAJOR

## (undated) DEVICE — SOL ADH LIQUID BENZOIN SWAB 0.6ML C1544

## (undated) DEVICE — BARRIER SEPRAFILM 5X6" SINGLE SHEET 4301-02

## (undated) DEVICE — SOL NACL 0.9% IRRIG 1000ML BOTTLE 07138-09

## (undated) DEVICE — ESU GROUND PAD UNIVERSAL W/O CORD

## (undated) DEVICE — SU MONOCRYL 4-0 PS-2 18" UND Y496G

## (undated) DEVICE — CATH TRAY FOLEY 16FR SILICONE 907416

## (undated) DEVICE — PREP CHLORAPREP 26ML TINTED ORANGE  260815

## (undated) DEVICE — GLOVE PROTEXIS BLUE W/NEU-THERA 7.0  2D73EB70

## (undated) DEVICE — GLOVE ESTEEM POWDER FREE SMT 6.5  2D72PT65

## (undated) DEVICE — SU VICRYL 0 CT-1 36" J346H

## (undated) DEVICE — DRSG STERI STRIP 1/4X3" R1541

## (undated) DEVICE — STRAP KNEE/BODY 31143004

## (undated) DEVICE — BNDG ABDOMINAL BINDER 10X26-50" 08140145

## (undated) RX ORDER — OXYTOCIN/0.9 % SODIUM CHLORIDE 30/500 ML
PLASTIC BAG, INJECTION (ML) INTRAVENOUS
Status: DISPENSED
Start: 2017-03-19